# Patient Record
Sex: FEMALE | Employment: FULL TIME | URBAN - NONMETROPOLITAN AREA
[De-identification: names, ages, dates, MRNs, and addresses within clinical notes are randomized per-mention and may not be internally consistent; named-entity substitution may affect disease eponyms.]

---

## 2020-08-12 ENCOUNTER — TELEPHONE (OUTPATIENT)
Dept: FAMILY MEDICINE CLINIC | Age: 35
End: 2020-08-12

## 2020-08-12 ENCOUNTER — OFFICE VISIT (OUTPATIENT)
Dept: FAMILY MEDICINE CLINIC | Age: 35
End: 2020-08-12
Payer: COMMERCIAL

## 2020-08-12 VITALS
SYSTOLIC BLOOD PRESSURE: 124 MMHG | BODY MASS INDEX: 36.86 KG/M2 | DIASTOLIC BLOOD PRESSURE: 68 MMHG | HEIGHT: 63 IN | WEIGHT: 208 LBS

## 2020-08-12 PROCEDURE — 99385 PREV VISIT NEW AGE 18-39: CPT | Performed by: NURSE PRACTITIONER

## 2020-08-12 PROCEDURE — G0444 DEPRESSION SCREEN ANNUAL: HCPCS | Performed by: NURSE PRACTITIONER

## 2020-08-12 PROCEDURE — G8431 POS CLIN DEPRES SCRN F/U DOC: HCPCS | Performed by: NURSE PRACTITIONER

## 2020-08-12 RX ORDER — ESCITALOPRAM OXALATE 5 MG/1
5 TABLET ORAL DAILY
Qty: 60 TABLET | Refills: 1 | Status: SHIPPED | OUTPATIENT
Start: 2020-08-12 | End: 2020-11-04 | Stop reason: ALTCHOICE

## 2020-08-12 RX ORDER — ALBUTEROL SULFATE 90 UG/1
AEROSOL, METERED RESPIRATORY (INHALATION)
COMMUNITY
Start: 2019-05-21 | End: 2021-04-08 | Stop reason: ALTCHOICE

## 2020-08-12 RX ORDER — BUPROPION HYDROCHLORIDE 150 MG/1
150 TABLET ORAL DAILY
COMMUNITY
Start: 2020-01-21 | End: 2021-01-06 | Stop reason: SDUPTHER

## 2020-08-12 RX ORDER — ALPRAZOLAM 0.25 MG/1
0.25 TABLET ORAL 3 TIMES DAILY PRN
Qty: 30 TABLET | Refills: 0 | Status: SHIPPED | OUTPATIENT
Start: 2020-08-12 | End: 2021-01-06 | Stop reason: SDUPTHER

## 2020-08-12 RX ORDER — LISINOPRIL 5 MG/1
5 TABLET ORAL DAILY
COMMUNITY
Start: 2020-01-21

## 2020-08-12 RX ORDER — PIOGLITAZONEHYDROCHLORIDE 15 MG/1
15 TABLET ORAL DAILY
Qty: 30 TABLET | Refills: 3 | Status: SHIPPED | OUTPATIENT
Start: 2020-08-12 | End: 2020-09-29 | Stop reason: ALTCHOICE

## 2020-08-12 RX ORDER — GLIMEPIRIDE 4 MG/1
8 TABLET ORAL
COMMUNITY
Start: 2020-01-21 | End: 2020-11-04 | Stop reason: ALTCHOICE

## 2020-08-12 RX ORDER — BUSPIRONE HYDROCHLORIDE 10 MG/1
10 TABLET ORAL 3 TIMES DAILY PRN
COMMUNITY
Start: 2019-03-08 | End: 2020-09-28 | Stop reason: SINTOL

## 2020-08-12 RX ORDER — ALBUTEROL SULFATE 90 UG/1
2 AEROSOL, METERED RESPIRATORY (INHALATION) EVERY 6 HOURS PRN
Qty: 1 INHALER | Refills: 3 | Status: SHIPPED | OUTPATIENT
Start: 2020-08-12

## 2020-08-12 SDOH — ECONOMIC STABILITY: FOOD INSECURITY: WITHIN THE PAST 12 MONTHS, THE FOOD YOU BOUGHT JUST DIDN'T LAST AND YOU DIDN'T HAVE MONEY TO GET MORE.: NEVER TRUE

## 2020-08-12 SDOH — ECONOMIC STABILITY: FOOD INSECURITY: WITHIN THE PAST 12 MONTHS, YOU WORRIED THAT YOUR FOOD WOULD RUN OUT BEFORE YOU GOT MONEY TO BUY MORE.: NEVER TRUE

## 2020-08-12 SDOH — ECONOMIC STABILITY: INCOME INSECURITY: HOW HARD IS IT FOR YOU TO PAY FOR THE VERY BASICS LIKE FOOD, HOUSING, MEDICAL CARE, AND HEATING?: NOT HARD AT ALL

## 2020-08-12 ASSESSMENT — ENCOUNTER SYMPTOMS
RHINORRHEA: 0
DIARRHEA: 0
CHEST TIGHTNESS: 0
SHORTNESS OF BREATH: 0
SORE THROAT: 0
CONSTIPATION: 0
WHEEZING: 0
ABDOMINAL PAIN: 0
COUGH: 0
SINUS PAIN: 1
SINUS PRESSURE: 1

## 2020-08-12 ASSESSMENT — PATIENT HEALTH QUESTIONNAIRE - PHQ9
SUM OF ALL RESPONSES TO PHQ QUESTIONS 1-9: 16
1. LITTLE INTEREST OR PLEASURE IN DOING THINGS: 2
6. FEELING BAD ABOUT YOURSELF - OR THAT YOU ARE A FAILURE OR HAVE LET YOURSELF OR YOUR FAMILY DOWN: 1
10. IF YOU CHECKED OFF ANY PROBLEMS, HOW DIFFICULT HAVE THESE PROBLEMS MADE IT FOR YOU TO DO YOUR WORK, TAKE CARE OF THINGS AT HOME, OR GET ALONG WITH OTHER PEOPLE: 1
5. POOR APPETITE OR OVEREATING: 1
3. TROUBLE FALLING OR STAYING ASLEEP: 2
8. MOVING OR SPEAKING SO SLOWLY THAT OTHER PEOPLE COULD HAVE NOTICED. OR THE OPPOSITE, BEING SO FIGETY OR RESTLESS THAT YOU HAVE BEEN MOVING AROUND A LOT MORE THAN USUAL: 3
9. THOUGHTS THAT YOU WOULD BE BETTER OFF DEAD, OR OF HURTING YOURSELF: 0
SUM OF ALL RESPONSES TO PHQ9 QUESTIONS 1 & 2: 5
4. FEELING TIRED OR HAVING LITTLE ENERGY: 2
SUM OF ALL RESPONSES TO PHQ QUESTIONS 1-9: 16
7. TROUBLE CONCENTRATING ON THINGS, SUCH AS READING THE NEWSPAPER OR WATCHING TELEVISION: 2
2. FEELING DOWN, DEPRESSED OR HOPELESS: 3

## 2020-08-12 NOTE — PROGRESS NOTES
Name: Marielena Ruby  : 1985         Chief Complaint:     Chief Complaint   Patient presents with   [facility] Doctor     moved from [place] 6 months ago    Asthma     dx: ? treated with albuterol prn (typically a couple times monthly, more frequently during spring and sometimes fall. Has been on advair in the past    Diabetes     type II Dx: ? treated best with [place] it was cost prohibitive with previous insurance, currently taking farxiga and glimepiride, readings morning fasting 100-120 120 evening, higher with increased stress    Migraine     since high schoo, sporadic, stress induced, throbbing pain in temples, blurry vision, nausea, vomiting, occurs once monthly lasting a day, takes excedrin migraine for relief.  Depression     treated with buproprion 150 daily and buspar as needed (1-2x weekly) but it causes dizziness, and nausea, feel empty at times and \"despair\"     Anxiety     treated with buproprion 150 daily and buspar as needed (1-2x weekly) but it causes dizziness, and nausea constant panic and anxiety, panic attacks once/week    Insomnia     difficulty falling asleep, racing thoughts       History of Present Illness:      Amanda Real is a 27 y.o.  female who presents with [employer] Doctor (moved from [place] 6 months ago); Asthma (dx: ? treated with albuterol prn (typically a couple times monthly, more frequently during spring and sometimes fall. Has been on advair in the past); Diabetes (type II Dx: ? treated best with [place] it was cost prohibitive with previous insurance, currently taking farxiga and glimepiride, readings morning fasting 100-120 120 evening, higher with increased stress); Migraine (since high schoo, sporadic, stress induced, throbbing pain in temples, blurry vision, nausea, vomiting, occurs once monthly lasting a day, takes excedrin migraine for relief. );  Depression (treated with buproprion 150 daily and buspar as needed (1-2x weekly) but it causes dizziness, and nausea, feel empty at times and \"despair\" ); Anxiety (treated with buproprion 150 daily and buspar as needed (1-2x weekly) but it causes dizziness, and nausea constant panic and anxiety, panic attacks once/week); and Insomnia (difficulty falling asleep, racing thoughts)      HPI   Asthma:   The patient was diagnosed in 2001. She admits using her albuterol inhaler \"a couple times per month\". She admits using it more during spring and fall when she experiences allergy symptoms, as well as while she is sick. She states that the albuterol is successful in alleviating her symptoms of tightness in the chest and cough. She denies symptoms of coughing, wheezing, or SOB today. She admits SOB when she exercises, but states it is related to being overweight. Diabetes:   She reports she was diagnosed with type II DM in 2010. She was previously on [place] which she states worked well for her, keeping her fasting glucose between 90-95. However, she switched medications because Januvia became too expensive. She is currently taking dapagliflozin 10mg daily and glimepiride 8mg daily. Her current fasting blood glucoses average 100-120 in the morning. She states that she \"doesn't feel as well\" on this diabetes regimen. She admits hypoglycemia when she skips breakfast, but otherwise denies hypoglycemic episodes. She is allergic to metformin and experiences hives and vomiting when taking it. Migraines:  She admits a history of migraines that occur about once a month. She is not interested in taking medication at this time. Depression/Anxiety:   The patient admits recently moving from [place] to [place] to move in with her boyfriend. She has started a new job at [employer]. She reports depression that has been worsened within the last 6 months. She feels \"empty\" and \"hopeless\". She denies thoughts of hurting herself or others.  She currently takes wellbutrin 150mg daily which she states has done well improving her mood. She has tried previous anti-depressants in the past but did not favor them due to weight gain. She admits heightened anxiety within the last 6 months including panic attacks that occur \"once or more a week\". She is taking bupropion 10mg as needed for panic attacks and states that it makes her feel nauseous, makes her blood pressure drop, and it is \"debilitating\". She takes bupropion a \"couple of times per week\". She reports a traumatic event that occurred in 2010 which she suffers PTSD. She used to see a counselor but has not within the last 6 months, she is interested in this today. Past Medical History:     Past Medical History:   Diagnosis Date    Allergic rhinitis     Asthma     DM [facility]diabetes mellitus) ([facility] [address]     Migraines       Reviewed all health maintenance requirements and ordered appropriate tests  Health Maintenance Due   Topic Date Due    Potassium monitoring  1984    Creatinine monitoring  1984    Varicella vaccine (1 of 2 - 2-dose childhood series) 10/07/1985    Pneumococcal 0-64 years Vaccine (1 of 1 - PPSV23) 10/07/1990    Diabetic foot exam  10/07/1994    A1C test (Diabetic or Prediabetic)  10/07/1994    Diabetic retinal exam  10/07/1994    Lipid screen  10/07/1994    HIV screen  10/07/1999    Diabetic microalbuminuria test  10/07/2002    Hepatitis B vaccine (1 of 3 - Risk 3-dose series) 10/07/2003    DTaP/Tdap/Td vaccine (1 - Tdap) 10/07/2003    Cervical cancer screen  10/07/2005       Past Surgical History:     Past Surgical History:   Procedure Laterality Date    [facility]  2007    [facility]    KNEE ARTHROSCOPY  2001    right knee    TONSILLECTOMY  1988        Medications:       Prior to Admission medications    Medication Sig Start Date End Date Taking?  Authorizing Provider   dapagliflozin (FARXIGA) 10 MG tablet Take 10 mg by mouth daily 4/22/20  Yes Historical Provider, MD   glimepiride (AMARYL) 4 MG tablet Take 8 mg by mouth every morning (before breakfast) 1/21/20  Yes Historical Provider, MD   buPROPion (WELLBUTRIN XL) 150 MG extended release tablet Take 150 mg by mouth daily 1/21/20  Yes Historical Provider, MD   lisinopril (PRINIVIL;ZESTRIL) 5 MG tablet Take 5 mg by mouth daily 1/21/20  Yes Historical Provider, MD   busPIRone (BUSPAR) 10 MG tablet Take 10 mg by mouth 3 times daily as needed 3/8/19  Yes Historical Provider, MD   levonorgestrel (MIRENA) IUD 52 mg 1 each by Intrauterine route once   Yes Historical Provider, MD   albuterol sulfate HFA (VENTOLIN HFA) 108 (90 Base) MCG/ACT inhaler INHALE 2 PUFFS BY MOUTH EVERY 6 HOURS AS NEEDED FOR WHEEZING 5/21/19  Yes Historical Provider, MD   pioglitazone (ACTOS) 15 MG tablet Take 1 tablet by mouth daily 8/12/20  Yes BRIDGETT Luu CNP   escitalopram (LEXAPRO) 5 MG tablet Take 1 tablet by mouth daily 8/12/20  Yes BRIDGETT Luu CNP   albuterol sulfate HFA (PROAIR HFA) 108 (90 Base) MCG/ACT inhaler Inhale 2 puffs into the lungs every 6 hours as needed for Wheezing or Shortness of Breath 8/12/20  Yes BRIDGETT Luu CNP   ALPRAZolam Thamas Miranda) 0.25 MG tablet Take 1 tablet by mouth 3 times daily as needed for Anxiety for up to 30 days. 8/12/20 9/11/20 Yes BRIDGETT Luu CNP        Allergies:       Metformin; Avocado; Banana; Codeine; Cucumber extract; Food; Nut [peanut-containing drug products]; Other; Seasonal; and Percocet [oxycodone-acetaminophen]    Social History:     Tobacco:    reports that she has never smoked. She has never used smokeless tobacco.  Alcohol:      reports current alcohol use. Drug Use:  reports no history of drug use.     Family History:     Family History   Problem Relation Age of Onset    Stroke Father     Heart Disease Father     Diabetes Father     High Blood Pressure Father     Diabetes type 2  Brother     Heart Attack Maternal Grandfather        Review of Systems:     Positive and Negative as described puffs into the lungs every 6 hours as needed for Wheezing or Shortness of Breath    ALPRAZolam (XANAX) 0.25 MG tablet 30 tablet 0     Sig: Take 1 tablet by mouth 3 times daily as needed for Anxiety for up to 30 days. Orders Placed This Encounter   Procedures    Lipid Panel     Standing Status:   Future     Standing Expiration Date:   8/12/2021     Order Specific Question:   Is Patient Fasting?/# of Hours     Answer:   non-fasting    Hemoglobin A1C     Standing Status:   Future     Standing Expiration Date:   8/12/2021    Comprehensive Metabolic Panel     Standing Status:   Future     Standing Expiration Date:   8/12/2021    CBC With Auto Differential     Standing Status:   Future     Standing Expiration Date:   8/12/2021    Microalbumin / Creatinine Urine Ratio     Standing Status:   Future     Standing Expiration Date:   8/12/2021    External Referral To Counseling Services     Referral Priority:   Routine     Referral Type:   Eval and Treat     Referral Reason:   Specialty Services Required     Referred to Provider:   Joshua Jimenez     Requested Specialty:   Clinical Counselor     Number of Visits Requested:   1    Positive Screen for Clinical Depression with a Documented Follow-up Plan         No results found for this visit on 08/12/20. Return in about 4 weeks (around 9/9/2020), or if symptoms worsen or fail to improve. Electronically signed by BRIDGETT Mckeon CNP on 08/19/20 at 10:02 AM.    On the basis of positive PHQ-9 screening (PHQ-9 Total Score: 16), the following plan was implemented: alteration in medication and referral to counseling. Patient will follow-up in 1 month(s) with PCP.

## 2020-08-12 NOTE — TELEPHONE ENCOUNTER
Patient was called, VM was left instructing patient to call the office. She was given a referral to counseling today. Please instruct the patient to call the counseling office and schedule a meeting. Counseling office details below, please share with patient:     [facility] [place]  [address]   Phone: [phone]  Fax: none  Email: [email or web address]    Thank you!

## 2020-09-27 LAB
ABSOLUTE BASO #: 0.1 X10E9/L (ref 0–0.2)
ABSOLUTE EOS #: 0.2 X10E9/L (ref 0–0.4)
ABSOLUTE LYMPH #: 3.9 X10E9/L (ref 1–3.5)
ABSOLUTE MONO #: 0.9 X10E9/L (ref 0–0.9)
ABSOLUTE NEUT #: 7 X10E9/L (ref 1.5–6.6)
ALBUMIN SERPL-MCNC: 4.2 G/DL (ref 3.2–5.3)
ALK PHOSPHATASE: 55 U/L (ref 39–130)
ALT SERPL-CCNC: 24 U/L (ref 0–31)
ANION GAP SERPL CALCULATED.3IONS-SCNC: 11 MMOL/L (ref 5–15)
AST SERPL-CCNC: 18 U/L (ref 0–41)
AVERAGE GLUCOSE: 252 MG/DL
BASOPHILS RELATIVE PERCENT: 0.6 %
BILIRUB SERPL-MCNC: 0.5 MG/DL (ref 0.3–1.2)
BUN BLDV-MCNC: 8 MG/DL (ref 5–23)
CALCIUM SERPL-MCNC: 9.4 MG/DL (ref 8.5–10.5)
CHLORIDE BLD-SCNC: 98 MMOL/L (ref 98–109)
CHOLESTEROL/HDL RATIO: 6 (ref 1–5)
CHOLESTEROL: 266 MG/DL (ref 150–200)
CO2: 27 MMOL/L (ref 22–32)
CREAT SERPL-MCNC: 0.55 MG/DL (ref 0.4–1)
CREATINE, URINE: 74.27 MG/DL
EGFR AFRICAN AMERICAN: >60 ML/MIN/1.73SQ.M
EGFR IF NONAFRICAN AMERICAN: >60 ML/MIN/1.73SQ.M
EOSINOPHILS RELATIVE PERCENT: 1.3 %
GLUCOSE: 299 MG/DL (ref 65–99)
HBA1C MFR BLD: 10.4 % (ref 4.4–6.4)
HCT VFR BLD CALC: 44.4 % (ref 35–47)
HDLC SERPL-MCNC: 44 MG/DL
HEMOGLOBIN: 14.7 G/DL (ref 11.7–15.5)
LDL CHOLESTEROL CALCULATED: 172 MG/DL
LDL/HDL RATIO: 3.9
LYMPHOCYTE %: 32.7 %
MCH RBC QN AUTO: 29.5 PG (ref 27–34)
MCHC RBC AUTO-ENTMCNC: 33 G/DL (ref 32–36)
MCV RBC AUTO: 89 FL (ref 80–100)
MICROALBUMIN/CREAT 24H UR: 0.8 MG/DL (ref 0–1.9)
MICROALBUMIN/CREAT UR-RTO: 10.8 MG/G CREAT (ref 0–30)
MONOCYTES # BLD: 7.5 %
NEUTROPHILS RELATIVE PERCENT: 57.9 %
PDW BLD-RTO: 13.4 % (ref 11.5–15)
PLATELETS: 371 X10E9/L (ref 150–450)
PMV BLD AUTO: 8.4 FL (ref 7–12)
POTASSIUM SERPL-SCNC: 3.9 MMOL/L (ref 3.5–5)
RBC: 4.97 X10E12/L (ref 3.8–5.2)
SODIUM BLD-SCNC: 136 MMOL/L (ref 134–146)
TOTAL PROTEIN: 7.4 G/DL (ref 6–8)
TRIGL SERPL-MCNC: 249 MG/DL (ref 27–150)
VLDLC SERPL CALC-MCNC: 50 MG/DL (ref 0–30)
WBC: 12 X10E9/L (ref 4–11)

## 2020-09-28 ENCOUNTER — OFFICE VISIT (OUTPATIENT)
Dept: FAMILY MEDICINE CLINIC | Age: 35
End: 2020-09-28
Payer: COMMERCIAL

## 2020-09-28 VITALS
HEIGHT: 63 IN | WEIGHT: 214 LBS | SYSTOLIC BLOOD PRESSURE: 118 MMHG | BODY MASS INDEX: 37.92 KG/M2 | DIASTOLIC BLOOD PRESSURE: 88 MMHG

## 2020-09-28 PROCEDURE — 90732 PPSV23 VACC 2 YRS+ SUBQ/IM: CPT | Performed by: NURSE PRACTITIONER

## 2020-09-28 PROCEDURE — 99214 OFFICE O/P EST MOD 30 MIN: CPT | Performed by: NURSE PRACTITIONER

## 2020-09-28 PROCEDURE — 90471 IMMUNIZATION ADMIN: CPT | Performed by: NURSE PRACTITIONER

## 2020-09-28 ASSESSMENT — PATIENT HEALTH QUESTIONNAIRE - PHQ9
SUM OF ALL RESPONSES TO PHQ QUESTIONS 1-9: 2
2. FEELING DOWN, DEPRESSED OR HOPELESS: 1
SUM OF ALL RESPONSES TO PHQ QUESTIONS 1-9: 2
SUM OF ALL RESPONSES TO PHQ9 QUESTIONS 1 & 2: 2
1. LITTLE INTEREST OR PLEASURE IN DOING THINGS: 1

## 2020-09-28 ASSESSMENT — ENCOUNTER SYMPTOMS
ABDOMINAL PAIN: 0
DIARRHEA: 0
NAUSEA: 1
CONSTIPATION: 0

## 2020-09-28 NOTE — PATIENT INSTRUCTIONS
Eat routine, scheduled meals to avoid low blood sugar episodes. Continue to follow a well balanced diet. Encouraged regular physical activity 30 minutes per day. Patient to follow up with counseling. Patient to call the office if any worsening symptoms or side effects. Continue to monitor blood sugar once daily. I will call patient with lab results.

## 2020-09-28 NOTE — PROGRESS NOTES
Name: Rohit Chau  : 1985         Chief Complaint:     Chief Complaint   Patient presents with    Follow-up     patient denies any issues. History of Present Illness:      Amanda Real is a 27 y.o.  female who presents with Follow-up (patient denies any issues. )      HPI   Anxiety/Depression:   The patient presents for a one month follow up for anxiety and depression. She is currently taking bupropion 150mg daily, escitalopram 5mg daily, and alprazolam 0.25mg prn. She admits discontinuing buspirone one month ago due to side effects and is feeling much better. She denies side effects from current medication. She admits that in regards to her depression, she is feeling 90% better. She denies feelings of emptiness and loneliness that were present prior. She admits continuous elevated anxiety but states that she is able to manage her stress much better. Patient is taking xanax \"a couple times per week\", but not daily. Admits one panic attack in the last month which was resolved with xanax. She has an order for counseling that she has been unable to begin due to her schedule but is planning to set this up next week. Denies abdominal pain, chest pain, or palpitations. Denies thoughts of hurting self or others. Admits increased heart rate when coming into doctor's offices. Diabetes Mellitus:  The patient is currently taking glimepiride 8mg daily, dapagliflozin 10mg daily, and pioglitazone 15mg daily. Admits feeling better with the addition of pioglitazone. She has an allergy to metformin and is unable to financial afford [place], she states. She admits compliance with all medication. She is monitoring her blood glucose once at home once in the morning, readings averaging 100 fasting. She admits several occasions of hypoglycemia when she skips meals. For exercise, she is walking her dogs three times per day and participating in yoga and pilates.  Her diet consists of [employer] meals that contain protein, vegetable, and a carb. Her last eye exam was 12/2019 and she is planning to follow up 12/2020. Past Medical History:     Past Medical History:   Diagnosis Date    Allergic rhinitis     Asthma     DM (diabetes mellitus) ([address]     Migraines       Reviewed all health maintenance requirements and ordered appropriate tests  Health Maintenance Due   Topic Date Due    Potassium monitoring  1984    Creatinine monitoring  1984    Varicella vaccine (1 of 2 - 2-dose childhood series) 10/07/1985    Diabetic foot exam  10/07/1994    A1C test (Diabetic or Prediabetic)  10/07/1994    Diabetic retinal exam  10/07/1994    Lipid screen  10/07/1994    HIV screen  10/07/1999    Diabetic microalbuminuria test  10/07/2002    Hepatitis B vaccine (1 of 3 - Risk 3-dose series) 10/07/2003    DTaP/Tdap/Td vaccine (1 - Tdap) 10/07/2003    Cervical cancer screen  10/07/2005    Flu vaccine (1) 09/01/2020       Past Surgical History:     Past Surgical History:   Procedure Laterality Date    [facility]  2007    [facility]    KNEE ARTHROSCOPY  2001    right knee    TONSILLECTOMY  1988        Medications:       Prior to Admission medications    Medication Sig Start Date End Date Taking?  Authorizing Provider   buPROPion (WELLBUTRIN XL) 150 MG extended release tablet Take 150 mg by mouth daily 1/21/20  Yes Historical Provider, MD   lisinopril (PRINIVIL;ZESTRIL) 5 MG tablet Take 5 mg by mouth daily 1/21/20  Yes Historical Provider, MD   levonorgestrel (MIRENA) IUD 52 mg 1 each by Intrauterine route once   Yes Historical Provider, MD   albuterol sulfate HFA (VENTOLIN HFA) 108 (90 Base) MCG/ACT inhaler INHALE 2 PUFFS BY MOUTH EVERY 6 HOURS AS NEEDED FOR WHEEZING 5/21/19  Yes Historical Provider, MD   pioglitazone (ACTOS) 15 MG tablet Take 1 tablet by mouth daily 8/12/20  Yes BRIDGETT Arndt CNP   escitalopram (LEXAPRO) 5 MG tablet Take 1 tablet by mouth daily 8/12/20  Yes Ashleigh Elder APRN - CNP   albuterol sulfate HFA (PROAIR HFA) 108 (90 Base) MCG/ACT inhaler Inhale 2 puffs into the lungs every 6 hours as needed for Wheezing or Shortness of Breath 8/12/20  Yes Severa HeatherBRIDGETT CNP   dapagliflozin (FARXIGA) 10 MG tablet Take 10 mg by mouth daily 4/22/20   Historical Provider, MD   glimepiride (AMARYL) 4 MG tablet Take 8 mg by mouth every morning (before breakfast) 1/21/20   Historical Provider, MD        Allergies:       Metformin; Avocado; Banana; Codeine; Cucumber extract; Food; Nut [peanut-containing drug products]; Other; Seasonal; and Percocet [oxycodone-acetaminophen]    Social History:     Tobacco:    reports that she has never smoked. She has never used smokeless tobacco.  Alcohol:      reports current alcohol use. Drug Use:  reports no history of drug use. Family History:     Family History   Problem Relation Age of Onset    Stroke Father     Heart Disease Father     Diabetes Father     High Blood Pressure Father     Diabetes type 2  Brother     Heart Attack Maternal Grandfather        Review of Systems:     Positive and Negative as described in HPI    Review of Systems   Constitutional: Negative for chills and fever. Cardiovascular: Negative for chest pain and palpitations. Gastrointestinal: Positive for nausea (Admits nausea when beginning actos and lexapro. Symptoms peaked 2 weeks ago and have been improving. Denies vomiting.). Negative for abdominal pain, constipation and diarrhea. Genitourinary: Negative for difficulty urinating, frequency and urgency. Denies increased hunger   Neurological: Positive for headaches (Admits tension headaches that are relieved with ibuprofen). Negative for dizziness, weakness, light-headedness and numbness. Physical Exam:   Vitals:  /88   Ht 5' 3\" (1.6 m)   Wt 214 lb (97.1 kg)   BMI 37.91 kg/m²     Physical Exam  Constitutional:       General: She is not in acute distress. Appearance: Normal appearance. She is obese. She is not ill-appearing or diaphoretic. HENT:      Head: Normocephalic. Cardiovascular:      Rate and Rhythm: Regular rhythm. Tachycardia present. Heart sounds: Normal heart sounds. No murmur. Comments:   Pulmonary:      Effort: Pulmonary effort is normal. No respiratory distress. Breath sounds: Normal breath sounds. No stridor. No wheezing, rhonchi or rales. Abdominal:      General: Bowel sounds are normal.      Palpations: Abdomen is soft. Neurological:      Mental Status: She is alert and oriented to person, place, and time. Psychiatric:         Mood and Affect: Mood normal.         Behavior: Behavior normal.         Thought Content: Thought content normal.         Judgment: Judgment normal.         Data:     No results found for: NA, K, CL, CO2, BUN, CREATININE, GLUCOSE, PROT, LABALBU, BILITOT, ALKPHOS, AST, ALT  No results found for: WBC, RBC, HGB, HCT, MCV, MCH, MCHC, RDW, PLT, MPV  No results found for: TSH  No results found for: CHOL, HDL, PSA, LABA1C    Assessment/Plan:      Diagnosis Orders   1. Type 2 diabetes mellitus with hyperglycemia, without long-term current use of insulin ([facility] [address]     2. Moderate episode of recurrent major depressive disorder ([facility] [address]     3. Anxiety     4. Mild intermittent asthma without complication           DM:  - Patient reports having labs completed last week that will be faxed to the office. I told her it is important for me to review those labs, specifically her A1c, before making any changes to her medications. When I receive her labs, I will call her to discuss results and alter treatment plan as necessary. At this time, I am pleased with her fasting blood glucose levels of 100. - Patient to continue monitoring blood glucose once per day and call the office with any concerns of hypoglycemia or hyperglycemia. - Supplied health counseling on diet and exercise. Discussed monitoring sugar and carb intake.    - Retinal eye exam completed 12/2019, patient will follow for repeat exam 12/2020.   - Patient denies diabetic foot exam and interest in diabetes education today. Anxiety/Depression:  - Patient to continue current medication regimen. Discussed importance of maintaining baseline levels of anxiety with the goal of decreasing xanax use. She would like to try counseling prior to making any medication changes. Wellness:  - Patient reports normal pap exam 2 years ago. She is interested in establishing a gynecologic provider. She was given the phone number for [facility] to schedule an appointment.   - Recommended flu vaccination this fall.   - Patient given [employer] information packet regarding PPSV23 d/t history of asthma and DM. Patient agreed and received vaccination today. Completed Refills   Requested Prescriptions      No prescriptions requested or ordered in this encounter       Orders Placed This Encounter   Procedures    Pneumococcal polysaccharide vaccine 23-valent greater than or equal to 1yo subcutaneous/IM        No results found for this visit on 09/28/20. Return in about 3 months (around 12/28/2020), or if symptoms worsen or fail to improve, for follow up. .    Electronically signed by BRIDGETT Mcgee CNP on 09/28/20 at 9:16 AM.

## 2020-09-29 RX ORDER — ATORVASTATIN CALCIUM 10 MG/1
10 TABLET, FILM COATED ORAL DAILY
Qty: 60 TABLET | Refills: 2 | Status: SHIPPED | OUTPATIENT
Start: 2020-09-29 | End: 2021-01-25

## 2020-09-29 RX ORDER — PIOGLITAZONEHYDROCHLORIDE 45 MG/1
45 TABLET ORAL DAILY
Qty: 60 TABLET | Refills: 2 | Status: SHIPPED | OUTPATIENT
Start: 2020-09-29 | End: 2020-11-04 | Stop reason: SDUPTHER

## 2020-11-04 ENCOUNTER — OFFICE VISIT (OUTPATIENT)
Dept: FAMILY MEDICINE CLINIC | Age: 35
End: 2020-11-04
Payer: COMMERCIAL

## 2020-11-04 VITALS
HEIGHT: 63 IN | BODY MASS INDEX: 38.45 KG/M2 | WEIGHT: 217 LBS | DIASTOLIC BLOOD PRESSURE: 80 MMHG | SYSTOLIC BLOOD PRESSURE: 112 MMHG | OXYGEN SATURATION: 99 % | HEART RATE: 98 BPM

## 2020-11-04 PROBLEM — F33.1 MODERATE EPISODE OF RECURRENT MAJOR DEPRESSIVE DISORDER (HCC): Status: ACTIVE | Noted: 2020-11-04

## 2020-11-04 PROBLEM — F41.9 ANXIETY: Status: ACTIVE | Noted: 2020-11-04

## 2020-11-04 PROCEDURE — G8431 POS CLIN DEPRES SCRN F/U DOC: HCPCS | Performed by: NURSE PRACTITIONER

## 2020-11-04 PROCEDURE — 99214 OFFICE O/P EST MOD 30 MIN: CPT | Performed by: NURSE PRACTITIONER

## 2020-11-04 PROCEDURE — G0444 DEPRESSION SCREEN ANNUAL: HCPCS | Performed by: NURSE PRACTITIONER

## 2020-11-04 RX ORDER — PIOGLITAZONEHYDROCHLORIDE 45 MG/1
45 TABLET ORAL DAILY
Qty: 60 TABLET | Refills: 2 | Status: SHIPPED | OUTPATIENT
Start: 2020-11-04 | End: 2021-03-04

## 2020-11-04 RX ORDER — ESCITALOPRAM OXALATE 10 MG/1
10 TABLET ORAL DAILY
Qty: 30 TABLET | Refills: 2 | Status: SHIPPED | OUTPATIENT
Start: 2020-11-04 | End: 2020-12-31

## 2020-11-04 ASSESSMENT — PATIENT HEALTH QUESTIONNAIRE - PHQ9
2. FEELING DOWN, DEPRESSED OR HOPELESS: 2
10. IF YOU CHECKED OFF ANY PROBLEMS, HOW DIFFICULT HAVE THESE PROBLEMS MADE IT FOR YOU TO DO YOUR WORK, TAKE CARE OF THINGS AT HOME, OR GET ALONG WITH OTHER PEOPLE: 1
SUM OF ALL RESPONSES TO PHQ QUESTIONS 1-9: 16
7. TROUBLE CONCENTRATING ON THINGS, SUCH AS READING THE NEWSPAPER OR WATCHING TELEVISION: 3
SUM OF ALL RESPONSES TO PHQ9 QUESTIONS 1 & 2: 4
5. POOR APPETITE OR OVEREATING: 0
4. FEELING TIRED OR HAVING LITTLE ENERGY: 3
3. TROUBLE FALLING OR STAYING ASLEEP: 0
SUM OF ALL RESPONSES TO PHQ QUESTIONS 1-9: 16
8. MOVING OR SPEAKING SO SLOWLY THAT OTHER PEOPLE COULD HAVE NOTICED. OR THE OPPOSITE, BEING SO FIGETY OR RESTLESS THAT YOU HAVE BEEN MOVING AROUND A LOT MORE THAN USUAL: 3
SUM OF ALL RESPONSES TO PHQ QUESTIONS 1-9: 16
9. THOUGHTS THAT YOU WOULD BE BETTER OFF DEAD, OR OF HURTING YOURSELF: 0
6. FEELING BAD ABOUT YOURSELF - OR THAT YOU ARE A FAILURE OR HAVE LET YOURSELF OR YOUR FAMILY DOWN: 3
1. LITTLE INTEREST OR PLEASURE IN DOING THINGS: 2

## 2020-11-04 ASSESSMENT — ENCOUNTER SYMPTOMS
VOMITING: 0
DIARRHEA: 0
COUGH: 1
NAUSEA: 0

## 2020-11-04 ASSESSMENT — COLUMBIA-SUICIDE SEVERITY RATING SCALE - C-SSRS
2. HAVE YOU ACTUALLY HAD ANY THOUGHTS OF KILLING YOURSELF?: NO
1. WITHIN THE PAST MONTH, HAVE YOU WISHED YOU WERE DEAD OR WISHED YOU COULD GO TO SLEEP AND NOT WAKE UP?: NO
6. HAVE YOU EVER DONE ANYTHING, STARTED TO DO ANYTHING, OR PREPARED TO DO ANYTHING TO END YOUR LIFE?: NO

## 2020-11-04 NOTE — PROGRESS NOTES
Name: Bryant Serrano  : 1985         Chief Complaint:     Chief Complaint   Patient presents with    Follow-up     Patient comes in for 1 month recheck. denies any issues. History of Present Illness:      Amanda Real is a 38 y.o.  female who presents with Follow-up (Patient comes in for 1 month recheck. denies any issues. )      HPI     The patient presents for 1 month follow up. She has a history of depression, anxiety, and DM. Diabetes Mellitus:   The patient's most recent A1c was 10.4 on 20. She is currently taking pioglitazone 45mg. Due to miscommunication with medications, she discontinued her dapagliflozin 10mg and glimepiride 8mg daily when she initiated pioglitazone. She is checking her blood glucose twice daily. Her fasting morning glucose averages 100-120. Her immediate post-prandial after dinner averages 150-200. She admits elevated numbers this weekend due to her sister's wedding. For exercise, she is using the stair stepper at night and walking her dogs 3 times daily. She is eating [employer] 4 times per week and is eating out less during the week. She admits eating a set amount of carbs. She denies hypoglycemic events. Denies lightheadedness, dizziness, shakiness, polyuria, polydipsia, or polyphagia. She denies sores that are slow to heal.     Depression/Anxiety:  The patient has a history of depression and anxiety. She is currently taking lexapro 5mg and wellbutrin 150mg daily. She states that she feels as if she is an \"overwhelming funk\". She admits feeling exhausted over the last 2 weeks which she attributes to her sister's recent wedding last weekend. She is sleeping 7-8 hour per night. Admits slight cough with intermittent clear nasal drainage. She denies fever, loss of taste or smell, diarrhea, nausea, or vomiting. She states that her anxiety comes and goes and yesterday she felt depressed.  She is unsure the reasoning for her depression but admits increased stress due MCG/ACT inhaler INHALE 2 PUFFS BY MOUTH EVERY 6 HOURS AS NEEDED FOR WHEEZING 5/21/19  Yes Historical Provider, MD   albuterol sulfate HFA (PROAIR HFA) 108 (90 Base) MCG/ACT inhaler Inhale 2 puffs into the lungs every 6 hours as needed for Wheezing or Shortness of Breath 8/12/20  Yes BRIDGETT Calles CNP        Allergies:       Metformin; Avocado; Banana; Codeine; Cucumber extract; Food; Nut [peanut-containing drug products]; Other; Seasonal; and Percocet [oxycodone-acetaminophen]    Social History:     Tobacco:    reports that she has never smoked. She has never used smokeless tobacco.  Alcohol:      reports current alcohol use. Drug Use:  reports no history of drug use. Family History:     Family History   Problem Relation Age of Onset    Stroke Father     Heart Disease Father     Diabetes Father     High Blood Pressure Father     Diabetes type 2  Brother     Heart Attack Maternal Grandfather        Review of Systems:     Positive and Negative as described in HPI    Review of Systems   Constitutional: Negative for fever. Respiratory: Positive for cough (Admits mild cough). Gastrointestinal: Negative for diarrhea, nausea and vomiting. Endocrine: Negative for polydipsia, polyphagia and polyuria. Neurological: Positive for headaches (Admits intermittent headaches). Negative for dizziness and light-headedness. Physical Exam:   Vitals:  /80   Pulse 98   Ht 5' 3\" (1.6 m)   Wt 217 lb (98.4 kg)   SpO2 99%   BMI 38.44 kg/m²     Physical Exam  Constitutional:       General: She is not in acute distress. Appearance: Normal appearance. She is obese. She is not ill-appearing or toxic-appearing. HENT:      Head: Normocephalic. Cardiovascular:      Rate and Rhythm: Normal rate and regular rhythm. Pulses:           Posterior tibial pulses are 2+ on the right side and 2+ on the left side. Heart sounds: Normal heart sounds. No murmur.    Pulmonary:      Effort: Pulmonary effort is normal. No respiratory distress. Breath sounds: Normal breath sounds. No stridor. No wheezing, rhonchi or rales. Feet:      Right foot:      Protective Sensation: 6 sites tested. 6 sites sensed. Skin integrity: Skin integrity normal. No ulcer, blister, skin breakdown, erythema, warmth, callus, dry skin or fissure. Toenail Condition: Right toenails are normal.      Left foot:      Protective Sensation: 6 sites tested. 6 sites sensed. Skin integrity: Skin integrity normal. No ulcer, blister, skin breakdown, erythema, warmth, callus, dry skin or fissure. Toenail Condition: Left toenails are normal.   Skin:     General: Skin is warm. Neurological:      Mental Status: She is alert and oriented to person, place, and time. Psychiatric:         Mood and Affect: Mood normal.         Behavior: Behavior normal.         Thought Content: Thought content normal.         Judgment: Judgment normal.         Data:     Lab Results   Component Value Date     09/26/2020    K 3.9 09/26/2020    CL 98 09/26/2020    CO2 27 09/26/2020    BUN 8 09/26/2020    CREATININE 0.55 09/26/2020    GLUCOSE 299 09/26/2020    PROT 7.4 09/26/2020    LABALBU 4.2 09/26/2020    BILITOT 0.5 09/26/2020    ALKPHOS 55 09/26/2020    AST 18 09/26/2020    ALT 24 09/26/2020     Lab Results   Component Value Date    WBC 12.0 09/26/2020    RBC 4.97 09/26/2020    HGB 14.7 09/26/2020    HCT 44.4 09/26/2020    MCV 89 09/26/2020    MCH 29.5 09/26/2020    MCHC 33.0 09/26/2020    RDW 13.4 09/26/2020     09/26/2020    MPV 8.4 09/26/2020     No results found for: TSH  Lab Results   Component Value Date    CHOL 266 09/26/2020    HDL 44 09/26/2020    LABA1C 10.4 09/26/2020       Assessment/Plan:      Diagnosis Orders   1. Type 2 diabetes mellitus without complication, without long-term current use of insulin (HCC)   DIABETES FOOT EXAM   2. Anxiety     3. Moderate episode of recurrent major depressive disorder ([facility] [address]     4. Positive depression screening         DM:   - Most recent A1c 10.4 on 9/26/20. Currently taking pioglitazone 45mg daily, exercising using stair stepper and walking, and using portion control and carb restriction. - Will add dapagliflozin 5mg daily with goal of upwards titration. Will likely add back third oral diabetic therapy at next visit. - Referral to diabetes education placed at previous visit. Patient has not completed d/t schedule but admits interest and plans to attend. - Reviewed diet and exercise recommendations. Reviewed s/s of hypoglycemia.   - Continue monitoring blood glucose once fasting in the morning and again 2 hours after dinner.   - Will complete A1c in 2 months. Anxiety/Depression:   - Will increase lexapro from 5mg to 10mg daily. - Continue counseling.   - Patient to call if symptoms worsen or persist.     Completed Refills   Requested Prescriptions     Signed Prescriptions Disp Refills    escitalopram (LEXAPRO) 10 MG tablet 30 tablet 2     Sig: Take 1 tablet by mouth daily    pioglitazone (ACTOS) 45 MG tablet 60 tablet 2     Sig: Take 1 tablet by mouth daily    dapagliflozin (FARXIGA) 5 MG tablet 30 tablet 2     Sig: Take 1 tablet by mouth every morning       Orders Placed This Encounter   Procedures     DIABETES FOOT EXAM        No results found for this visit on 11/04/20. Return in about 2 months (around 1/4/2021), or if symptoms worsen or fail to improve, for f/u DM and depression with A1c.     Electronically signed by BRIDGETT George CNP on 11/04/20 at 1:58 PM.

## 2021-01-06 ENCOUNTER — OFFICE VISIT (OUTPATIENT)
Dept: FAMILY MEDICINE CLINIC | Age: 36
End: 2021-01-06
Payer: COMMERCIAL

## 2021-01-06 VITALS
HEART RATE: 99 BPM | HEIGHT: 63 IN | WEIGHT: 218 LBS | OXYGEN SATURATION: 99 % | BODY MASS INDEX: 38.62 KG/M2 | DIASTOLIC BLOOD PRESSURE: 80 MMHG | SYSTOLIC BLOOD PRESSURE: 126 MMHG

## 2021-01-06 DIAGNOSIS — F41.9 ANXIETY: ICD-10-CM

## 2021-01-06 DIAGNOSIS — F33.1 MODERATE EPISODE OF RECURRENT MAJOR DEPRESSIVE DISORDER (HCC): ICD-10-CM

## 2021-01-06 DIAGNOSIS — E11.9 TYPE 2 DIABETES MELLITUS WITHOUT COMPLICATION, WITHOUT LONG-TERM CURRENT USE OF INSULIN (HCC): Primary | ICD-10-CM

## 2021-01-06 DIAGNOSIS — Z13.31 POSITIVE DEPRESSION SCREENING: ICD-10-CM

## 2021-01-06 DIAGNOSIS — E78.49 OTHER HYPERLIPIDEMIA: ICD-10-CM

## 2021-01-06 DIAGNOSIS — E78.5 HYPERLIPIDEMIA, UNSPECIFIED HYPERLIPIDEMIA TYPE: ICD-10-CM

## 2021-01-06 PROCEDURE — 99214 OFFICE O/P EST MOD 30 MIN: CPT | Performed by: NURSE PRACTITIONER

## 2021-01-06 RX ORDER — ALPRAZOLAM 0.25 MG/1
0.25 TABLET ORAL 3 TIMES DAILY PRN
Qty: 15 TABLET | Refills: 0 | Status: SHIPPED | OUTPATIENT
Start: 2021-01-06 | End: 2021-01-21

## 2021-01-06 RX ORDER — BLOOD-GLUCOSE METER
1 KIT MISCELLANEOUS DAILY
Qty: 1 KIT | Refills: 0 | Status: SHIPPED | OUTPATIENT
Start: 2021-01-06

## 2021-01-06 RX ORDER — ESCITALOPRAM OXALATE 20 MG/1
20 TABLET ORAL DAILY
Qty: 30 TABLET | Refills: 2 | Status: SHIPPED | OUTPATIENT
Start: 2021-01-06 | End: 2021-02-01

## 2021-01-06 RX ORDER — BUPROPION HYDROCHLORIDE 150 MG/1
150 TABLET ORAL DAILY
Qty: 60 TABLET | Refills: 2 | Status: SHIPPED | OUTPATIENT
Start: 2021-01-06 | End: 2021-04-08 | Stop reason: SDUPTHER

## 2021-01-06 ASSESSMENT — PATIENT HEALTH QUESTIONNAIRE - PHQ9
9. THOUGHTS THAT YOU WOULD BE BETTER OFF DEAD, OR OF HURTING YOURSELF: 0
SUM OF ALL RESPONSES TO PHQ QUESTIONS 1-9: 16
8. MOVING OR SPEAKING SO SLOWLY THAT OTHER PEOPLE COULD HAVE NOTICED. OR THE OPPOSITE, BEING SO FIGETY OR RESTLESS THAT YOU HAVE BEEN MOVING AROUND A LOT MORE THAN USUAL: 2
2. FEELING DOWN, DEPRESSED OR HOPELESS: 2
4. FEELING TIRED OR HAVING LITTLE ENERGY: 2
SUM OF ALL RESPONSES TO PHQ QUESTIONS 1-9: 16
1. LITTLE INTEREST OR PLEASURE IN DOING THINGS: 2
5. POOR APPETITE OR OVEREATING: 2
3. TROUBLE FALLING OR STAYING ASLEEP: 2

## 2021-01-06 ASSESSMENT — COLUMBIA-SUICIDE SEVERITY RATING SCALE - C-SSRS
1. WITHIN THE PAST MONTH, HAVE YOU WISHED YOU WERE DEAD OR WISHED YOU COULD GO TO SLEEP AND NOT WAKE UP?: NO
6. HAVE YOU EVER DONE ANYTHING, STARTED TO DO ANYTHING, OR PREPARED TO DO ANYTHING TO END YOUR LIFE?: NO
2. HAVE YOU ACTUALLY HAD ANY THOUGHTS OF KILLING YOURSELF?: NO

## 2021-01-06 NOTE — PROGRESS NOTES
Name: Francis Fitzpatrick  : 1985         Chief Complaint:     Chief Complaint   Patient presents with    Depression     Patient returns for 2 month F/U. States she is having a hard time with depression. States it may just be the season. History of Present Illness:      Amanda Real is a 38 y.o.  female who presents with Depression (Patient returns for 2 month F/U. States she is having a hard time with depression. States it may just be the season. )      HPI  The patient presents for a 2 month follow up. She has a history of diabetes mellitus, hyperlipidemia, asthma, depression, and anxiety. DM:   Diabetic diet/low carb diet compliance:  She admits high carbohydrate diet. She had 2 molars removed and is on a soft diet. She is not following a diabetic diet. Current exercise: She is walking 2-3 times per day with the dogs and stair stepper. Most recent A1c: 10.4% on 20. Diabetes Education: Referral in place. She denies attendance. Frequency of exercise: 3 times per week  Frequency of glucose testing at home: Twice daily, once fasting in the morning and again after dinner  Home blood sugar records: fasting in the mornin-150. After dinner: 200-250. Glucometer at home: Admits her screen broke 3 weeks ago   History of hypoglycemic episodes: no  Most recent eye examination: 2019  Diabetic foot check in the past year Yes. Last diabetic foot check: 2020  Current symptoms: Denies excessive hunger, excessive thirst, or increased frequency of urination. Denies numbness/tingling in hands or feet, or major changes in weight. Denies vision changes or sores that are slow to heal. Admits fatigue. reports that she has never smoked.  She has never used smokeless tobacco.   Medication compliance:  compliant all of the time Medication Therapy: pioglitazone 45mg once daily and dapagliflozin 5mg. She was previously on [place] and [place] but discontinued due to cost. She notes an allergic reaction to metformin including hives and vomiting. She is reinitiating medication due to miscommunication related to stopping her previous medications. Anxiety/Depression:   Current medication regimen includes lexapro 10mg and welbutrin XL 150mg. She admits post-holiday \"letdown\". She is taking xanax once every couple weeks. Admits panic attacks once every couple of weeks. Denies thoughts of hurting self or others. She is currently enrolled in counseling once per month. Hyperlipidemia:   Current medication regimen includes atorvastatin 10mg. She attempts to follow a low fat, low cholesterol diet. For exercise, she is walking and using the stair stepper. Past Medical History:     Past Medical History:   Diagnosis Date    Allergic rhinitis     Asthma     DM (diabetes mellitus) ([facility] [address]     Migraines       Reviewed all health maintenance requirements and ordered appropriate tests  Health Maintenance Due   Topic Date Due    Hepatitis C screen  1985    Varicella vaccine (1 of 2 - 2-dose childhood series) 10/07/1985    Diabetic retinal exam  10/07/1994    HIV screen  10/07/1999    Hepatitis B vaccine (1 of 3 - Risk 3-dose series) 10/07/2003    DTaP/Tdap/Td vaccine (1 - Tdap) 10/07/2003    Cervical cancer screen  10/07/2005    Flu vaccine (1) 09/01/2020    A1C test (Diabetic or Prediabetic)  12/26/2020       Past Surgical History:     Past Surgical History:   Procedure Laterality Date    [facility]    KNEE ARTHROSCOPY  2001    right knee    TONSILLECTOMY  1988        Medications:       Prior to Admission medications    Medication Sig Start Date End Date Taking?  Authorizing Provider   glucose monitoring kit (FREESTYLE) monitoring kit 1 kit by Does not apply route daily 1/6/21  Yes Houston Vizcaino, APRN - CNP buPROPion (WELLBUTRIN XL) 150 MG extended release tablet Take 1 tablet by mouth daily 1/6/21  Yes BRIDGETT Baires CNP   escitalopram (LEXAPRO) 20 MG tablet Take 1 tablet by mouth daily 1/6/21  Yes BRIDGETT Baires CNP   ALPRAZolam Pisek Stager) 0.25 MG tablet Take 1 tablet by mouth 3 times daily as needed for Anxiety for up to 15 days. 1/6/21 1/21/21 Yes BRIDGETT Baires CNP   dapagliflozin (FARXIGA) 10 MG tablet Take 1 tablet by mouth every morning 1/6/21  Yes BRIDGETT Baires CNP   SITagliptin (JANUVIA) 50 MG tablet Take 1 tablet by mouth daily 1/6/21  Yes BRIDGETT Baires CNP   pioglitazone (ACTOS) 45 MG tablet Take 1 tablet by mouth daily 11/4/20  Yes BRIDGETT Baires CNP   atorvastatin (LIPITOR) 10 MG tablet Take 1 tablet by mouth daily 9/29/20  Yes BRIDGETT Baires CNP   lisinopril (PRINIVIL;ZESTRIL) 5 MG tablet Take 5 mg by mouth daily 1/21/20  Yes Historical Provider, MD   levonorgestrel (MIRENA) IUD 52 mg 1 each by Intrauterine route once   Yes Historical Provider, MD   albuterol sulfate HFA (VENTOLIN HFA) 108 (90 Base) MCG/ACT inhaler INHALE 2 PUFFS BY MOUTH EVERY 6 HOURS AS NEEDED FOR WHEEZING 5/21/19  Yes Historical Provider, MD   albuterol sulfate HFA (PROAIR HFA) 108 (90 Base) MCG/ACT inhaler Inhale 2 puffs into the lungs every 6 hours as needed for Wheezing or Shortness of Breath 8/12/20  Yes BRIDGETT Baires CNP        Allergies:       Metformin, Avocado, Banana, Codeine, Cucumber extract, Food, Nut [peanut-containing drug products], Other, Seasonal, and Percocet [oxycodone-acetaminophen]    Social History:     Tobacco:    reports that she has never smoked. She has never used smokeless tobacco.  Alcohol:      reports current alcohol use. Drug Use:  reports no history of drug use.     Family History:     Family History   Problem Relation Age of Onset    Stroke Father     Heart Disease Father     Diabetes Father     High Blood Pressure Father  Diabetes type 2  Brother     Heart Attack Maternal Grandfather        Review of Systems:     Positive and Negative as described in HPI    Review of Systems   Constitutional: Positive for fatigue. Eyes: Negative for visual disturbance. Endocrine: Negative for polydipsia, polyphagia and polyuria. Psychiatric/Behavioral: Positive for dysphoric mood. Negative for suicidal ideas. The patient is nervous/anxious. Physical Exam:   Vitals:  /80   Pulse 99   Ht 5' 3\" (1.6 m)   Wt 218 lb (98.9 kg)   SpO2 99%   BMI 38.62 kg/m²     Physical Exam  Constitutional:       General: She is not in acute distress. Appearance: Normal appearance. She is obese. She is not ill-appearing or toxic-appearing. HENT:      Head: Normocephalic. Cardiovascular:      Rate and Rhythm: Normal rate and regular rhythm. Heart sounds: Normal heart sounds. No murmur. Pulmonary:      Effort: Pulmonary effort is normal. No respiratory distress. Breath sounds: Normal breath sounds. No stridor. No wheezing, rhonchi or rales. Abdominal:      General: Bowel sounds are normal. There is no distension. Palpations: Abdomen is soft. There is no mass. Tenderness: There is no abdominal tenderness. There is no guarding. Hernia: No hernia is present. Skin:     General: Skin is warm. Neurological:      Mental Status: She is alert and oriented to person, place, and time. Psychiatric:         Mood and Affect: Mood normal.         Behavior: Behavior normal.         Thought Content:  Thought content normal.         Judgment: Judgment normal.         Data:     Lab Results   Component Value Date     09/26/2020    K 3.9 09/26/2020    CL 98 09/26/2020    CO2 27 09/26/2020    BUN 8 09/26/2020    CREATININE 0.55 09/26/2020    GLUCOSE 299 09/26/2020    PROT 7.4 09/26/2020    LABALBU 4.2 09/26/2020    BILITOT 0.5 09/26/2020    ALKPHOS 55 09/26/2020    AST 18 09/26/2020    ALT 24 09/26/2020     Lab Results Component Value Date    WBC 12.0 09/26/2020    RBC 4.97 09/26/2020    HGB 14.7 09/26/2020    HCT 44.4 09/26/2020    MCV 89 09/26/2020    MCH 29.5 09/26/2020    MCHC 33.0 09/26/2020    RDW 13.4 09/26/2020     09/26/2020    MPV 8.4 09/26/2020     No results found for: TSH  Lab Results   Component Value Date    CHOL 266 09/26/2020    HDL 44 09/26/2020    LABA1C 10.4 09/26/2020       Assessment/Plan:      Diagnosis Orders   1. Type 2 diabetes mellitus without complication, without long-term current use of insulin (HCC)  ALT    AST    Basic Metabolic Panel    CBC    glucose monitoring kit (FREESTYLE) monitoring kit   2. Hyperlipidemia, unspecified hyperlipidemia type  ALT    AST    Basic Metabolic Panel    CBC    Lipid Panel   3. Positive depression screening  Positive Screen for Clinical Depression with a Documented Follow-up Plan    4. Moderate episode of recurrent major depressive disorder (HCC)  buPROPion (WELLBUTRIN XL) 150 MG extended release tablet   5. Anxiety  ALPRAZolam (XANAX) 0.25 MG tablet   6. Other hyperlipidemia       DM:   - POC A1c 11.1 today. This has increased from 10.4 on 9/2020. Supplied health counseling at length on the importance of diabetic diet compliance and physical activity. - Patient has referral to diabetic education but has not scheduled appt. - Will increase dapagliflozin from 5mg to 10mg once daily. - Will add januvia 50mg once daily with the goal of increasing to 100mg daily. Patient admits a recent change in her insurance. If this medication is not covered, she is to call the office. Encouraged her to call her insurance company to discuss coverage of DM medications. - Reviewed signs/symptoms of hypoglycemia, how to correct, and when to notify the office.   - Discussed the importance of managing her DM. If not well controlled on triple oral therapy, will consider insulin initiation.   - F/u 4 weeks.      Hyperlipidemia: - Repeat lipid panel. Will consider increasing atorvastatin dose. - Encouraged stretching and remaining well hydrated to reduce muscle aches. Anxiety/Depression:   - Symptoms not controlled. Will increase lexapro from 10mg to 20mg. - PDMP reviewed. Will refill xanax prn.   - Reviewed stress relieving activities. Completed Refills   Requested Prescriptions     Signed Prescriptions Disp Refills    glucose monitoring kit (FREESTYLE) monitoring kit 1 kit 0     Si kit by Does not apply route daily    buPROPion (WELLBUTRIN XL) 150 MG extended release tablet 60 tablet 2     Sig: Take 1 tablet by mouth daily    escitalopram (LEXAPRO) 20 MG tablet 30 tablet 2     Sig: Take 1 tablet by mouth daily    ALPRAZolam (XANAX) 0.25 MG tablet 15 tablet 0     Sig: Take 1 tablet by mouth 3 times daily as needed for Anxiety for up to 15 days.  dapagliflozin (FARXIGA) 10 MG tablet 30 tablet 2     Sig: Take 1 tablet by mouth every morning    SITagliptin (JANUVIA) 50 MG tablet 30 tablet 2     Sig: Take 1 tablet by mouth daily       Orders Placed This Encounter   Procedures    ALT     Standing Status:   Future     Standing Expiration Date:   2022    AST     Standing Status:   Future     Standing Expiration Date:   2022    Basic Metabolic Panel     Standing Status:   Future     Standing Expiration Date:   2022    CBC     Standing Status:   Future     Standing Expiration Date:   2022    Lipid Panel     Standing Status:   Future     Standing Expiration Date:   2022     Order Specific Question:   Is Patient Fasting?/# of Hours     Answer:   not fasting    Positive Screen for Clinical Depression with a Documented Follow-up Plan      Standing Status:   Future     Standing Expiration Date:   2021        No results found for this visit on 21. Return in about 4 weeks (around 2/3/2021), or if symptoms worsen or fail to improve, for DM f/u . Electronically signed by BRIDGETT Fischer CNP on 01/06/21 at 10:11 AM.

## 2021-01-06 NOTE — PATIENT INSTRUCTIONS
Increase lexapro from 10mg to 20mg once daily. Increase dapagliflozin from 5mg to 10mg once daily. SURVEY:    You may be receiving a survey from [employer] regarding your visit today. Please complete the survey to enable us to provide the highest quality of care to you and your family. If you are unable to score a \"very good' on any question, please call the office to discuss how we can improve your experience. We appreciate your feedback. Thank you!

## 2021-03-04 RX ORDER — PIOGLITAZONEHYDROCHLORIDE 45 MG/1
TABLET ORAL
Qty: 90 TABLET | Refills: 0 | Status: SHIPPED | OUTPATIENT
Start: 2021-03-04 | End: 2021-05-05 | Stop reason: ALTCHOICE

## 2021-03-04 NOTE — TELEPHONE ENCOUNTER
Attempted to reach pt., no answer, left VM to call office to get back on the schedule for DM f/u.
Rx filled. Please call patient to schedule DM f/u. She no showed her appt 2/2021. Thanks!
Asthma     Diabetes mellitus (HCC)     Anxiety     Moderate episode of recurrent major depressive disorder ([facility] [address]     Other hyperlipidemia

## 2021-04-06 RX ORDER — SITAGLIPTIN 50 MG/1
TABLET, FILM COATED ORAL
Qty: 30 TABLET | Refills: 0 | Status: SHIPPED | OUTPATIENT
Start: 2021-04-06 | End: 2021-04-08 | Stop reason: ALTCHOICE

## 2021-04-06 RX ORDER — DAPAGLIFLOZIN 10 MG/1
TABLET, FILM COATED ORAL
Qty: 30 TABLET | Refills: 0 | Status: SHIPPED | OUTPATIENT
Start: 2021-04-06 | End: 2021-05-03

## 2021-04-06 NOTE — TELEPHONE ENCOUNTER
Attempted to reach pt., no answer, left VM needs DM f/u and asked if she has seen Dietician yet, please call office.         Health Maintenance   Topic Date Due    Hepatitis C screen  Never done    Varicella vaccine (1 of 2 - 2-dose childhood series) Never done    Diabetic retinal exam  Never done    HIV screen  Never done    COVID-19 Vaccine (1) Never done    Hepatitis B vaccine (1 of 3 - Risk 3-dose series) Never done    DTaP/Tdap/Td vaccine (1 - Tdap) Never done    Cervical cancer screen  Never done    A1C test (Diabetic or Prediabetic)  12/26/2020    Flu vaccine (Season Ended) 09/01/2021    Diabetic microalbuminuria test  09/26/2021    Lipid screen  09/26/2021    Potassium monitoring  09/26/2021    Creatinine monitoring  09/26/2021    Diabetic foot exam  11/04/2021    Pneumococcal 0-64 years Vaccine  Completed    Hepatitis A vaccine  Aged Out    Hib vaccine  Aged Out    Meningococcal (ACWY) vaccine  Aged Out             (applicable per patient's age: Cancer Screenings, Depression Screening, Fall Risk Screening, Immunizations)    Hemoglobin A1C (%)   Date Value   09/26/2020 10.4 (H)     LDL Calculated (mg/dL)   Date Value   09/26/2020 172 (H)     AST (U/L)   Date Value   09/26/2020 18     ALT (U/L)   Date Value   09/26/2020 24     BUN (mg/dL)   Date Value   09/26/2020 8      (goal A1C is < 7)   (goal LDL is <100) need 30-50% reduction from baseline     BP Readings from Last 3 Encounters:   01/06/21 126/80   11/04/20 112/80   09/28/20 118/88    (goal /80)      All Future Testing planned in [facility]:  Lab Frequency Next Occurrence   Lipid Panel Once 02/24/2021   Hemoglobin A1C Once 02/24/2021   Comprehensive Metabolic Panel Once 06/39/6019   CBC With Auto Differential Once 02/24/2021   Microalbumin / Creatinine Urine Ratio Once 02/24/2021   ALT Once 02/24/2021   AST Once 15/74/2106   Basic Metabolic Panel Once 90/58/7584   CBC Once 02/24/2021   Lipid Panel Once 02/24/2021   Positive Screen for Clinical Depression with a Documented Follow-up Plan  Once 01/06/2021       Next Visit Date:  No future appointments.          Patient Active Problem List:     Asthma     Diabetes mellitus ([facility] [address]     Anxiety     Moderate episode of recurrent major depressive disorder ([facility] [address]     Other hyperlipidemia

## 2021-04-07 PROBLEM — F32.0 CURRENT MILD EPISODE OF MAJOR DEPRESSIVE DISORDER WITHOUT PRIOR EPISODE (HCC): Status: ACTIVE | Noted: 2021-04-07

## 2021-04-07 PROBLEM — F41.0 PANIC ATTACKS: Status: ACTIVE | Noted: 2018-01-12

## 2021-04-07 PROBLEM — Z30.430 ENCOUNTER FOR INSERTION OF INTRAUTERINE CONTRACEPTIVE DEVICE: Status: ACTIVE | Noted: 2017-02-16

## 2021-04-08 ENCOUNTER — OFFICE VISIT (OUTPATIENT)
Dept: FAMILY MEDICINE CLINIC | Age: 36
End: 2021-04-08
Payer: COMMERCIAL

## 2021-04-08 ENCOUNTER — HOSPITAL ENCOUNTER (OUTPATIENT)
Age: 36
Discharge: HOME | End: 2021-04-08
Payer: COMMERCIAL

## 2021-04-08 VITALS
SYSTOLIC BLOOD PRESSURE: 112 MMHG | OXYGEN SATURATION: 98 % | RESPIRATION RATE: 18 BRPM | HEART RATE: 100 BPM | BODY MASS INDEX: 39.41 KG/M2 | HEIGHT: 63 IN | DIASTOLIC BLOOD PRESSURE: 82 MMHG | WEIGHT: 222.4 LBS

## 2021-04-08 DIAGNOSIS — E11.9 TYPE 2 DIABETES MELLITUS WITHOUT COMPLICATION, WITHOUT LONG-TERM CURRENT USE OF INSULIN (HCC): Primary | ICD-10-CM

## 2021-04-08 DIAGNOSIS — E11.65 TYPE 2 DIABETES MELLITUS WITH HYPERGLYCEMIA, WITHOUT LONG-TERM CURRENT USE OF INSULIN (HCC): ICD-10-CM

## 2021-04-08 DIAGNOSIS — E11.9 TYPE 2 DIABETES MELLITUS WITHOUT COMPLICATION, WITHOUT LONG-TERM CURRENT USE OF INSULIN (HCC): ICD-10-CM

## 2021-04-08 DIAGNOSIS — F41.9 ANXIETY: ICD-10-CM

## 2021-04-08 DIAGNOSIS — E78.5 HYPERLIPIDEMIA, UNSPECIFIED HYPERLIPIDEMIA TYPE: ICD-10-CM

## 2021-04-08 DIAGNOSIS — F33.1 MODERATE EPISODE OF RECURRENT MAJOR DEPRESSIVE DISORDER (HCC): ICD-10-CM

## 2021-04-08 LAB
ABSOLUTE EOS #: 0.16 K/UL (ref 0–0.44)
ABSOLUTE IMMATURE GRANULOCYTE: 0.07 K/UL (ref 0–0.3)
ABSOLUTE LYMPH #: 3.63 K/UL (ref 1.1–3.7)
ABSOLUTE MONO #: 1.08 K/UL (ref 0.1–1.2)
ALBUMIN SERPL-MCNC: 3.9 G/DL (ref 3.5–5.2)
ALBUMIN/GLOBULIN RATIO: 1.2 (ref 1–2.5)
ALP BLD-CCNC: 63 U/L (ref 35–104)
ALT SERPL-CCNC: 22 U/L (ref 5–33)
ANION GAP SERPL CALCULATED.3IONS-SCNC: 11 MMOL/L (ref 9–17)
AST SERPL-CCNC: 14 U/L
BASOPHILS # BLD: 1 % (ref 0–2)
BASOPHILS ABSOLUTE: 0.1 K/UL (ref 0–0.2)
BILIRUB SERPL-MCNC: 0.46 MG/DL (ref 0.3–1.2)
BUN BLDV-MCNC: 11 MG/DL (ref 6–20)
BUN/CREAT BLD: 20 (ref 9–20)
CALCIUM SERPL-MCNC: 9.8 MG/DL (ref 8.6–10.4)
CHLORIDE BLD-SCNC: 101 MMOL/L (ref 98–107)
CO2: 25 MMOL/L (ref 20–31)
CREAT SERPL-MCNC: 0.56 MG/DL (ref 0.5–0.9)
CREATININE URINE: 46.3 MG/DL (ref 28–217)
DIFFERENTIAL TYPE: ABNORMAL
EOSINOPHILS RELATIVE PERCENT: 1 % (ref 1–4)
ESTIMATED AVERAGE GLUCOSE: 229 MG/DL
GFR AFRICAN AMERICAN: >60 ML/MIN
GFR NON-AFRICAN AMERICAN: >60 ML/MIN
GFR SERPL CREATININE-BSD FRML MDRD: ABNORMAL ML/MIN/{1.73_M2}
GFR SERPL CREATININE-BSD FRML MDRD: ABNORMAL ML/MIN/{1.73_M2}
GLUCOSE BLD-MCNC: 194 MG/DL (ref 70–99)
HBA1C MFR BLD: 10.6 %
HBA1C MFR BLD: 9.6 % (ref 4–6)
HCT VFR BLD CALC: 43 % (ref 36.3–47.1)
HEMOGLOBIN: 14.2 G/DL (ref 11.9–15.1)
IMMATURE GRANULOCYTES: 1 %
LYMPHOCYTES # BLD: 26 % (ref 24–43)
MCH RBC QN AUTO: 29.8 PG (ref 25.2–33.5)
MCHC RBC AUTO-ENTMCNC: 33 G/DL (ref 28.4–34.8)
MCV RBC AUTO: 90.3 FL (ref 82.6–102.9)
MICROALBUMIN/CREAT 24H UR: <12 MG/L
MICROALBUMIN/CREAT UR-RTO: NORMAL MCG/MG CREAT
MONOCYTES # BLD: 8 % (ref 3–12)
NRBC AUTOMATED: 0 PER 100 WBC
PDW BLD-RTO: 12.9 % (ref 11.8–14.4)
PLATELET # BLD: 394 K/UL (ref 138–453)
PLATELET ESTIMATE: ABNORMAL
PMV BLD AUTO: 9.7 FL (ref 8.1–13.5)
POTASSIUM SERPL-SCNC: 4.1 MMOL/L (ref 3.7–5.3)
RBC # BLD: 4.76 M/UL (ref 3.95–5.11)
RBC # BLD: ABNORMAL 10*6/UL
SEG NEUTROPHILS: 63 % (ref 36–65)
SEGMENTED NEUTROPHILS ABSOLUTE COUNT: 8.79 K/UL (ref 1.5–8.1)
SODIUM BLD-SCNC: 137 MMOL/L (ref 135–144)
TOTAL PROTEIN: 7.1 G/DL (ref 6.4–8.3)
WBC # BLD: 13.8 K/UL (ref 3.5–11.3)
WBC # BLD: ABNORMAL 10*3/UL

## 2021-04-08 PROCEDURE — 80061 LIPID PANEL: CPT

## 2021-04-08 PROCEDURE — 36415 COLL VENOUS BLD VENIPUNCTURE: CPT

## 2021-04-08 PROCEDURE — 83036 HEMOGLOBIN GLYCOSYLATED A1C: CPT

## 2021-04-08 PROCEDURE — 82570 ASSAY OF URINE CREATININE: CPT

## 2021-04-08 PROCEDURE — 85025 COMPLETE CBC W/AUTO DIFF WBC: CPT

## 2021-04-08 PROCEDURE — 83036 HEMOGLOBIN GLYCOSYLATED A1C: CPT | Performed by: NURSE PRACTITIONER

## 2021-04-08 PROCEDURE — 82043 UR ALBUMIN QUANTITATIVE: CPT

## 2021-04-08 PROCEDURE — 99214 OFFICE O/P EST MOD 30 MIN: CPT | Performed by: NURSE PRACTITIONER

## 2021-04-08 PROCEDURE — 80053 COMPREHEN METABOLIC PANEL: CPT

## 2021-04-08 RX ORDER — METHYLPREDNISOLONE 4 MG
TABLET, DOSE PACK ORAL
COMMUNITY
End: 2022-10-19 | Stop reason: ALTCHOICE

## 2021-04-08 RX ORDER — BUPROPION HYDROCHLORIDE 150 MG/1
150 TABLET ORAL DAILY
Qty: 90 TABLET | Refills: 1 | Status: SHIPPED | OUTPATIENT
Start: 2021-04-08 | End: 2022-03-25

## 2021-04-08 RX ORDER — DULAGLUTIDE 0.75 MG/.5ML
0.75 INJECTION, SOLUTION SUBCUTANEOUS WEEKLY
Qty: 4 PEN | Refills: 0 | Status: SHIPPED | OUTPATIENT
Start: 2021-04-08 | End: 2021-05-03

## 2021-04-08 NOTE — PROGRESS NOTES
Name: Raheel Napoleon  : 1985         Chief Complaint:     Chief Complaint   Patient presents with    Diabetes     3 month f/u, A1C in office today, she is fasting and will complete rest of open labs. She reports BS running 100-120, denies hypoglycemia, neuropathy, or visual changes (sees [facility], next appt. 2021)       History of Present Illness:      Amanda Real is a 38 y.o.  female who presents with Diabetes (3 month f/u, A1C in office today, she is fasting and will complete rest of open labs. She reports BS running 100-120, denies hypoglycemia, neuropathy, or visual changes (sees [facility], next appt. 2021))      HPI    DM:   Diabetic diet/low carb diet compliance: Following low sugar, low carb, high protein diet. Current exercise: Gym with cardio and weight lifting  Most recent A1c: 11.1% on 2020  Diabetes Education: Referral in place. She denies attendance due to financial concerns  Frequency of exercise: 3 times per week  Frequency of glucose testing at home: three days per week  Glucometer at home: yes  Fasting glucose: 120  History of hypoglycemic episodes: One episode several weeks ago, states she did not eat anything during that time. Symptoms included dizziness, nausea, and lightheadedness. Most recent eye examination: 2019. Upcoming appointment 2021. Diabetic foot check in the past year Yes. Last diabetic foot check: 2020  Current symptoms: Denies excessive thirst or increased frequency of urination. Denies numbness/tingling in hands or feet, or major changes in weight. Denies vision changes or sores that are slow to heal. Admits fatigue. Admits episodes of excessive hunger. reports that she has never smoked. She has never used smokeless tobacco.   Medication compliance:  compliant all of the time  Medication Therapy: pioglitazone 45mg once daily, januvia 50mg, dapagliflozin 10mg. She notes an allergic reaction to metformin including hives and vomiting. Past Medical History:     Past Medical History:   Diagnosis Date    Allergic rhinitis     Asthma     DM (diabetes mellitus) ([facility] [address]     Migraines       Reviewed all health maintenance requirements and ordered appropriate tests  Health Maintenance Due   Topic Date Due    Hepatitis C screen  Never done    Diabetic retinal exam  Never done    HIV screen  Never done    Cervical cancer screen  Never done       Past Surgical History:     Past Surgical History:   Procedure Laterality Date    [facility]  2007    [facility]    KNEE ARTHROSCOPY  2001    right knee    TONSILLECTOMY  1988        Medications:       Prior to Admission medications    Medication Sig Start Date End Date Taking?  Authorizing Provider   Multiple Vitamins-Minerals (BODY/HAIR/SKIN/NAILS) CAPS Take by mouth   Yes Historical Provider, MD   buPROPion (WELLBUTRIN XL) 150 MG extended release tablet Take 1 tablet by mouth daily 4/8/21  Yes BRIDGETT Taylor CNP   Dulaglutide (TRULICITY) 8.87 JH/9.8KZ SOPN Inject 0.75 mg into the skin once a week 4/8/21  Yes BRIDGETT Taylor CNP   SITagliptin (JANUVIA) 100 MG tablet Take 1 tablet by mouth daily 4/8/21  Yes BRIDGETT Taylor CNP   FARXIGA 10 MG tablet TAKE 1 TABLET BY MOUTH EVERY DAY IN THE MORNING 4/6/21  Yes BRIDGETT Taylor CNP   pioglitazone (ACTOS) 45 MG tablet TAKE 1 TABLET BY MOUTH EVERY DAY 3/4/21  Yes BRIDGETT Taylor CNP   escitalopram (LEXAPRO) 20 MG tablet TAKE 1 TABLET BY MOUTH EVERY DAY 2/1/21  Yes BRIDGETT Taylor CNP   atorvastatin (LIPITOR) 10 MG tablet TAKE 1 TABLET BY MOUTH EVERY DAY 1/25/21  Yes BRIDGETT Taylor CNP   glucose monitoring kit (FREESTYLE) monitoring kit 1 kit by Does not apply route daily 1/6/21  Yes BRIDGETT Taylor CNP   lisinopril (PRINIVIL;ZESTRIL) 5 MG tablet Take 5 mg by mouth daily 1/21/20  Yes Historical Provider, MD   levonorgestrel (MIRENA) IUD 52 mg 1 each by Intrauterine route once   Yes Historical Provider, MD   albuterol sulfate HFA (PROAIR HFA) 108 (90 Base) MCG/ACT inhaler Inhale 2 puffs into the lungs every 6 hours as needed for Wheezing or Shortness of Breath 8/12/20  Yes Malinda Harrington, APRN - CNP        Allergies:       Metformin, Avocado, Banana, Codeine, Cucumber extract, Food, Nut [peanut-containing drug products], Other, Seasonal, and Percocet [oxycodone-acetaminophen]    Social History:     Tobacco:    reports that she has never smoked. She has never used smokeless tobacco.  Alcohol:      reports current alcohol use. Drug Use:  reports no history of drug use. Family History:     Family History   Problem Relation Age of Onset    Stroke Father     Heart Disease Father     Diabetes Father     High Blood Pressure Father     Diabetes type 2  Brother     Heart Attack Maternal Grandfather        Review of Systems:     Positive and Negative as described in HPI    Review of Systems   Endocrine: Positive for polyphagia. Negative for polydipsia and polyuria. Neurological: Negative for numbness. Psychiatric/Behavioral:        Admits her mood has improved since getting the covid vaccination and being able to be more social and return to the gym. She denies interest in adjusting psych medications or their dosages as she notes improvement in her symptoms. Physical Exam:   Vitals:  /82 (Site: Left Upper Arm, Position: Sitting, Cuff Size: Large Adult)   Pulse 100   Resp 18   Ht 5' 3\" (1.6 m)   Wt 222 lb 6.4 oz (100.9 kg)   SpO2 98%   BMI 39.40 kg/m²     Physical Exam  Constitutional:       General: She is not in acute distress. Appearance: Normal appearance. She is obese. She is not ill-appearing or diaphoretic. HENT:      Head: Normocephalic. Cardiovascular:      Rate and Rhythm: Regular rhythm. Tachycardia present. Heart sounds: Normal heart sounds. No murmur. Pulmonary:      Effort: Pulmonary effort is normal. No respiratory distress.       Breath sounds: Normal breath sounds. No stridor. No wheezing, rhonchi or rales. Skin:     General: Skin is warm. Neurological:      Mental Status: She is alert and oriented to person, place, and time. Psychiatric:         Mood and Affect: Mood normal.         Behavior: Behavior normal.         Thought Content: Thought content normal.         Judgment: Judgment normal.         Data:     Lab Results   Component Value Date     09/26/2020    K 3.9 09/26/2020    CL 98 09/26/2020    CO2 27 09/26/2020    BUN 8 09/26/2020    CREATININE 0.55 09/26/2020    GLUCOSE 299 09/26/2020    PROT 7.4 09/26/2020    LABALBU 4.2 09/26/2020    BILITOT 0.5 09/26/2020    ALKPHOS 55 09/26/2020    AST 18 09/26/2020    ALT 24 09/26/2020     Lab Results   Component Value Date    WBC 13.8 04/08/2021    RBC 4.76 04/08/2021    RBC 4.97 09/26/2020    HGB 14.2 04/08/2021    HCT 43.0 04/08/2021    MCV 90.3 04/08/2021    MCH 29.8 04/08/2021    MCHC 33.0 04/08/2021    RDW 12.9 04/08/2021     04/08/2021    MPV 9.7 04/08/2021     No results found for: TSH  Lab Results   Component Value Date    CHOL 266 09/26/2020    HDL 44 09/26/2020    LABA1C 10.6 04/08/2021       Assessment/Plan:      Diagnosis Orders   1. Type 2 diabetes mellitus without complication, without long-term current use of insulin (HCC)  POCT glycosylated hemoglobin (Hb A1C)   2. Moderate episode of recurrent major depressive disorder (HCC)  buPROPion (WELLBUTRIN XL) 150 MG extended release tablet   3. Anxiety         Wellness:   -Discussed need for Pap smear. Patient states she will schedule with women's health clinic    Anxiety:  -Stable. No change in medication at this time. DM:  -POC A1c 10.6 today. Discussed that this is considered uncontrolled. Reviewed risks related to uncontrolled diabetes.  -Discussed diabetic diet and routine exercise at length. Reviewed importance of weight loss in the treatment of uncontrolled diabetes.   -Continue Farxiga 10 mg daily  -Increase sitagliptin (Januvia) from 50 mg to 100 mg daily  -I would like to switch the patient from Actos 45 mg to Trulicity 0.76 mg once weekly. Discussed with the patient that we may need to work in conjunction with insurance company to have Trulicity covered which may take days-weeks. She is to continue Actos until we can confirm Trulicity or an alternative medication is covered. We will notify patient when to discontinue Actos and initiate new medication. Patient agreeable to plan. -Follow-up 3 months with repeat A1c. If DM remains uncontrolled despite triple therapy, will consider adding basal insulin. Completed Refills   Requested Prescriptions     Signed Prescriptions Disp Refills    buPROPion (WELLBUTRIN XL) 150 MG extended release tablet 90 tablet 1     Sig: Take 1 tablet by mouth daily    Dulaglutide (TRULICITY) 9.33 AUDREY/8.8WY SOPN 4 pen 0     Sig: Inject 0.75 mg into the skin once a week    SITagliptin (JANUVIA) 100 MG tablet 30 tablet 5     Sig: Take 1 tablet by mouth daily       Orders Placed This Encounter   Procedures    POCT glycosylated hemoglobin (Hb A1C)        Results for POC orders placed in visit on 04/08/21   POCT glycosylated hemoglobin (Hb A1C)   Result Value Ref Range    Hemoglobin A1C 10.6 %       Return in about 3 months (around 7/8/2021), or if symptoms worsen or fail to improve, for DM f/u with A1c.     Electronically signed by BRIDGETT Sloan CNP on 04/08/21 at 11:21 AM.

## 2021-04-08 NOTE — PATIENT INSTRUCTIONS
Increase Januvia from 50mg to 100mg daily    SURVEY:    You may be receiving a survey from [employer] regarding your visit today. Please complete the survey to enable us to provide the highest quality of care to you and your family. If you cannot score us a very good on any question, please call the office to discuss how we could have made your experience a very good one. Thank you.

## 2021-04-09 LAB
CHOLESTEROL/HDL RATIO: 3.6
CHOLESTEROL: 144 MG/DL
HDLC SERPL-MCNC: 40 MG/DL
LDL CHOLESTEROL: 82 MG/DL (ref 0–130)
TRIGL SERPL-MCNC: 112 MG/DL
VLDLC SERPL CALC-MCNC: ABNORMAL MG/DL (ref 1–30)

## 2021-04-12 ENCOUNTER — TELEPHONE (OUTPATIENT)
Dept: FAMILY MEDICINE CLINIC | Age: 36
End: 2021-04-12

## 2021-04-12 DIAGNOSIS — D72.829 LEUKOCYTOSIS, UNSPECIFIED TYPE: Primary | ICD-10-CM

## 2021-04-12 NOTE — TELEPHONE ENCOUNTER
Patient notified of lab results from 4/8/2021 including A1c of 9.6. Patient confirms she was able to obtain Trulicity and administered her first dose yesterday. She has discontinued pioglitazone 45 mg and I encouraged her to remain off of this medication. She will continue Januvia 100 mg daily and Farxiga 10 mg daily. She will call office when Trulicity refill is needed. Follow-up in office in 3 months with repeat A1c or sooner if concerns arise. Repeat CBC in 4 weeks d/t elevated WBC.

## 2021-05-03 RX ORDER — ESCITALOPRAM OXALATE 20 MG/1
TABLET ORAL
Qty: 90 TABLET | Refills: 1 | Status: SHIPPED | OUTPATIENT
Start: 2021-05-03 | End: 2021-11-01

## 2021-05-03 RX ORDER — DAPAGLIFLOZIN 10 MG/1
TABLET, FILM COATED ORAL
Qty: 30 TABLET | Refills: 0 | Status: SHIPPED | OUTPATIENT
Start: 2021-05-03 | End: 2021-06-01

## 2021-05-03 RX ORDER — DULAGLUTIDE 0.75 MG/.5ML
0.75 INJECTION, SOLUTION SUBCUTANEOUS WEEKLY
Qty: 4 PEN | Refills: 0 | Status: SHIPPED | OUTPATIENT
Start: 2021-05-03 | End: 2021-05-05 | Stop reason: ALTCHOICE

## 2021-05-05 ENCOUNTER — TELEPHONE (OUTPATIENT)
Dept: FAMILY MEDICINE CLINIC | Age: 36
End: 2021-05-05

## 2021-05-05 RX ORDER — DULAGLUTIDE 1.5 MG/.5ML
1.5 INJECTION, SOLUTION SUBCUTANEOUS WEEKLY
Qty: 6 PEN | Refills: 1 | Status: SHIPPED | OUTPATIENT
Start: 2021-05-05 | End: 2021-08-03

## 2021-05-05 NOTE — TELEPHONE ENCOUNTER
Pt called in for her 4 week check in on Trulicity.    -She states her readings are averaging 100-120  -And over all no side effects other then having no appetite and always feeling full. Pt confirms she has d/c'd the Actos 45  Also she is taking Farxiga 10 mg and Januvia 100 mg    She is currently taking Trulicity 0.88 and needs to increase to 1.5 mg weekly. Please send to [facility] in [place].

## 2021-05-19 ENCOUNTER — TELEPHONE (OUTPATIENT)
Dept: FAMILY MEDICINE CLINIC | Age: 36
End: 2021-05-19

## 2021-05-19 NOTE — TELEPHONE ENCOUNTER
Attempted to reach pt., no answer, left VM to return call. Needs to complete open labs, see phone note from 04/12/21. Orders extended one more week.

## 2021-07-18 ENCOUNTER — HOSPITAL ENCOUNTER (EMERGENCY)
Age: 36
Discharge: HOME | End: 2021-07-18
Attending: EMERGENCY MEDICINE
Payer: COMMERCIAL

## 2021-07-18 VITALS
TEMPERATURE: 97.9 F | HEIGHT: 63 IN | BODY MASS INDEX: 36.86 KG/M2 | DIASTOLIC BLOOD PRESSURE: 77 MMHG | SYSTOLIC BLOOD PRESSURE: 141 MMHG | OXYGEN SATURATION: 98 % | HEART RATE: 93 BPM | RESPIRATION RATE: 18 BRPM | WEIGHT: 208 LBS

## 2021-07-18 DIAGNOSIS — R11.2 NON-INTRACTABLE VOMITING WITH NAUSEA, UNSPECIFIED VOMITING TYPE: Primary | ICD-10-CM

## 2021-07-18 LAB
CHP ED QC CHECK: NORMAL
GLUCOSE BLD-MCNC: 212 MG/DL
GLUCOSE BLD-MCNC: 212 MG/DL (ref 65–105)

## 2021-07-18 PROCEDURE — 96374 THER/PROPH/DIAG INJ IV PUSH: CPT

## 2021-07-18 PROCEDURE — 99284 EMERGENCY DEPT VISIT MOD MDM: CPT

## 2021-07-18 PROCEDURE — 96361 HYDRATE IV INFUSION ADD-ON: CPT

## 2021-07-18 PROCEDURE — 82947 ASSAY GLUCOSE BLOOD QUANT: CPT

## 2021-07-18 PROCEDURE — 6360000002 HC RX W HCPCS: Performed by: EMERGENCY MEDICINE

## 2021-07-18 PROCEDURE — 2580000003 HC RX 258: Performed by: EMERGENCY MEDICINE

## 2021-07-18 RX ORDER — ONDANSETRON 4 MG/1
4 TABLET, ORALLY DISINTEGRATING ORAL EVERY 8 HOURS PRN
Qty: 20 TABLET | Refills: 0 | Status: SHIPPED | OUTPATIENT
Start: 2021-07-18 | End: 2022-02-11

## 2021-07-18 RX ORDER — ONDANSETRON 2 MG/ML
4 INJECTION INTRAMUSCULAR; INTRAVENOUS ONCE
Status: COMPLETED | OUTPATIENT
Start: 2021-07-18 | End: 2021-07-18

## 2021-07-18 RX ORDER — 0.9 % SODIUM CHLORIDE 0.9 %
1000 INTRAVENOUS SOLUTION INTRAVENOUS ONCE
Status: COMPLETED | OUTPATIENT
Start: 2021-07-18 | End: 2021-07-18

## 2021-07-18 RX ADMIN — ONDANSETRON 4 MG: 2 INJECTION INTRAMUSCULAR; INTRAVENOUS at 17:27

## 2021-07-18 RX ADMIN — SODIUM CHLORIDE 1000 ML: 9 INJECTION, SOLUTION INTRAVENOUS at 17:27

## 2021-07-18 NOTE — ED PROVIDER NOTES
MCG/ACT INHALER    Inhale 2 puffs into the lungs every 6 hours as needed for Wheezing or Shortness of Breath    ATORVASTATIN (LIPITOR) 10 MG TABLET    TAKE 1 TABLET BY MOUTH EVERY DAY    BUPROPION (WELLBUTRIN XL) 150 MG EXTENDED RELEASE TABLET    Take 1 tablet by mouth daily    DULAGLUTIDE (TRULICITY) 1.5 AH/9.3OF SOPN    Inject 1.5 mg into the skin once a week    ESCITALOPRAM (LEXAPRO) 20 MG TABLET    TAKE 1 TABLET BY MOUTH EVERY DAY    FARXIGA 10 MG TABLET    TAKE 1 TABLET BY MOUTH EVERY DAY IN THE MORNING    GLUCOSE MONITORING KIT (FREESTYLE) MONITORING KIT    1 kit by Does not apply route daily    LEVONORGESTREL (MIRENA) IUD 52 MG    1 each by Intrauterine route once    LISINOPRIL (PRINIVIL;ZESTRIL) 5 MG TABLET    Take 5 mg by mouth daily    MULTIPLE VITAMINS-MINERALS (BODY/HAIR/SKIN/NAILS) CAPS    Take by mouth    SITAGLIPTIN (JANUVIA) 100 MG TABLET    Take 1 tablet by mouth daily       ALLERGIES     is allergic to metformin, avocado, banana, codeine, cucumber extract, food, nut [peanut-containing drug products], other, seasonal, and percocet [oxycodone-acetaminophen]. FAMILY HISTORY     She indicated that her mother is . She indicated that her father is . She indicated that her sister is alive. She indicated that her brother is alive. She indicated that her maternal grandfather is . family history includes Diabetes in her father; Diabetes type 2  in her brother; Heart Attack in her maternal grandfather; Heart Disease in her father; High Blood Pressure in her father; Stroke in her father. SOCIAL HISTORY      reports that she has never smoked. She has never used smokeless tobacco. She reports current alcohol use. She reports that she does not use drugs. PHYSICAL EXAM     INITIAL VITALS:  height is 5' 3\" (1.6 m) and weight is 94.3 kg (208 lb). Her oral temperature is 97.9 °F (36.6 °C). Her blood pressure is 138/98 (abnormal) and her pulse is 112.  Her respiration is 18 and 1. Non-intractable vomiting with nausea, unspecified vomiting type          DISPOSITION/PLAN   DISPOSITION Decision To Discharge 07/18/2021 06:06:11 PM      Condition on Disposition    good    PATIENT REFERRED TO:  Geeta Arriola [id number] [phone]    In 1 week  As needed      DISCHARGE MEDICATIONS:  New Prescriptions    ONDANSETRON (ZOFRAN ODT) 4 MG DISINTEGRATING TABLET    Take 1 tablet by mouth every 8 hours as needed for Nausea       (Please note that portions of this note were completed with a voice recognition program.  Efforts were made to edit the dictations but occasionally words are mis-transcribed.)    Edgar Roblero MD,, MD   Attending Emergency Physician         Nicole Maldonado MD  07/18/21 [id number]

## 2021-07-20 ENCOUNTER — OFFICE VISIT (OUTPATIENT)
Dept: FAMILY MEDICINE CLINIC | Age: 36
End: 2021-07-20
Payer: COMMERCIAL

## 2021-07-20 VITALS
HEIGHT: 63 IN | OXYGEN SATURATION: 97 % | TEMPERATURE: 97.8 F | DIASTOLIC BLOOD PRESSURE: 80 MMHG | BODY MASS INDEX: 38.45 KG/M2 | SYSTOLIC BLOOD PRESSURE: 130 MMHG | HEART RATE: 111 BPM | WEIGHT: 217 LBS

## 2021-07-20 DIAGNOSIS — E11.9 TYPE 2 DIABETES MELLITUS WITHOUT COMPLICATION, WITHOUT LONG-TERM CURRENT USE OF INSULIN (HCC): Primary | ICD-10-CM

## 2021-07-20 DIAGNOSIS — R19.7 DIARRHEA, UNSPECIFIED TYPE: ICD-10-CM

## 2021-07-20 LAB — HBA1C MFR BLD: 8.5 %

## 2021-07-20 PROCEDURE — 83036 HEMOGLOBIN GLYCOSYLATED A1C: CPT | Performed by: NURSE PRACTITIONER

## 2021-07-20 PROCEDURE — 3052F HG A1C>EQUAL 8.0%<EQUAL 9.0%: CPT | Performed by: NURSE PRACTITIONER

## 2021-07-20 PROCEDURE — 99214 OFFICE O/P EST MOD 30 MIN: CPT | Performed by: NURSE PRACTITIONER

## 2021-07-20 RX ORDER — AMOXICILLIN 500 MG/1
TABLET, FILM COATED ORAL
COMMUNITY
Start: 2021-07-13 | End: 2022-02-11

## 2021-07-20 ASSESSMENT — ENCOUNTER SYMPTOMS
VOMITING: 1
NAUSEA: 1
COUGH: 1
DIARRHEA: 1

## 2021-07-20 NOTE — PATIENT INSTRUCTIONS
SURVEY:    You may be receiving a survey from [employer] regarding your visit today. Please complete the survey to enable us to provide the highest quality of care to you and your family. If you cannot score us a very good on any question, please call the office to discuss how we could of made your experience a very good one. Thank you.

## 2021-07-20 NOTE — PROGRESS NOTES
Name: Gabriella Lozoya  : 1985         Chief Complaint:     Chief Complaint   Patient presents with    Follow-up    Cough     patient started on  with cough,     Nausea    Emesis     unable to keep water down. very weak. went to the ER  night. starting nausea meds today.  Otalgia    Diarrhea       History of Present Illness:      Amanda Real is a 38 y.o.  female who presents with Follow-up, Cough (patient started on  with cough, ), Nausea, Emesis (unable to keep water down. very weak. went to the ER  night. starting nausea meds today. ), Otalgia, and Diarrhea      HPI     DM:  Diabetic diet/low carb diet compliance: Following low carb and low sugar  Current exercise: Gym 4 days per week. Weights, cardio, treadmill  Frequency of glucose testing at home: TID  Glucometer at home: yes  Fasting glucose: 150 after dinner, fasting in the morning 90  History of hypoglycemic episodes: Admits hypoglycemic episodes when skipping meals. Occur 1-2 monthly. Most recent eye examination: 2021  Diabetic foot check in the past year Yes. Last diabetic foot check: 2020  Current symptoms: Denies excessive thirst, hunger, or increased frequency of urination. Denies numbness/tingling in hands or feet, or major changes in weight. Denies vision changes or sores that are slow to heal.    reports that she has never smoked. She has never used smokeless tobacco.   Medication compliance:  compliant all of the time  Medication Therapy: farxiga 86QK QD, trulicity 5.5TU weekly, januvia 100mg QD  Hemoglobin A1C (%)   Date Value   2021 8.5   2021 9.6 (H)   2021 10.6   2020 10.4 (H)      Vomiting:   Patient admits symptoms of nausea, vomiting, and diarrhea that began 3 days ago. She was taking amoxicillin for dental procedure at this time and admits to having single alcoholic beverage.   She was evaluated in the emergency department on 2021 was given IV fluids and Zofran and discharged under good conditions, per ED note. Today, she states that her symptoms have improved. She continues to have some nausea. Vomiting has resolved. She admits improving diarrhea, last episode yesterday. She admits a cough. Denies fever or chills. Denies sick contacts. Past Medical History:     Past Medical History:   Diagnosis Date    Allergic rhinitis     Anxiety     Asthma     Depression     DM (diabetes mellitus) ([address]     Hyperlipidemia     Hypertension     Migraines       Reviewed all health maintenance requirements and ordered appropriate tests  Health Maintenance Due   Topic Date Due    Hepatitis C screen  Never done    HIV screen  Never done    Cervical cancer screen  Never done       Past Surgical History:     Past Surgical History:   Procedure Laterality Date    [facility]    KNEE ARTHROSCOPY  2001    right knee    TONSILLECTOMY  1988        Medications:       Prior to Admission medications    Medication Sig Start Date End Date Taking?  Authorizing Provider   ondansetron (ZOFRAN ODT) 4 MG disintegrating tablet Take 1 tablet by mouth every 8 hours as needed for Nausea 7/18/21  Yes Morgan Griffin MD   FARXIGA 10 MG tablet TAKE 1 TABLET BY MOUTH EVERY DAY IN THE MORNING 6/1/21  Yes BRIDGETT Zhou CNP   Dulaglutide (TRULICITY) 1.5 IG/2.9KE SOPN Inject 1.5 mg into the skin once a week 5/5/21  Yes BRIDGETT Zhou CNP   escitalopram (LEXAPRO) 20 MG tablet TAKE 1 TABLET BY MOUTH EVERY DAY 5/3/21  Yes BRIDGETT Zhou CNP   Multiple Vitamins-Minerals (BODY/HAIR/SKIN/NAILS) CAPS Take by mouth   Yes Historical Provider, MD   buPROPion (WELLBUTRIN XL) 150 MG extended release tablet Take 1 tablet by mouth daily 4/8/21  Yes BRIDGETT Zhou CNP   SITagliptin (JANUVIA) 100 MG tablet Take 1 tablet by mouth daily 4/8/21  Yes BRIDGETT Zhou CNP   atorvastatin (LIPITOR) 10 MG tablet TAKE 1 TABLET BY MOUTH EVERY DAY 1/25/21 Yes BRIDGETT Blankenship CNP   glucose monitoring kit (FREESTYLE) monitoring kit 1 kit by Does not apply route daily 1/6/21  Yes BRIDGETT Blankenship CNP   lisinopril (PRINIVIL;ZESTRIL) 5 MG tablet Take 5 mg by mouth daily 1/21/20  Yes Historical Provider, MD   levonorgestrel (MIRENA) IUD 52 mg 1 each by Intrauterine route once   Yes Historical Provider, MD   albuterol sulfate HFA (PROAIR HFA) 108 (90 Base) MCG/ACT inhaler Inhale 2 puffs into the lungs every 6 hours as needed for Wheezing or Shortness of Breath 8/12/20  Yes BRIDGETT Blankenship CNP   Amoxicillin 500 MG TABS TAKE 1 TABLET BY MOUTH THREE TIMES A DAY 7/13/21   Historical Provider, MD        Allergies:       Metformin, Avocado, Banana, Codeine, Cucumber extract, Food, Nut [peanut-containing drug products], Other, Seasonal, and Percocet [oxycodone-acetaminophen]    Social History:     Tobacco:    reports that she has never smoked. She has never used smokeless tobacco.  Alcohol:      reports current alcohol use. Drug Use:  reports no history of drug use. Family History:     Family History   Problem Relation Age of Onset    Stroke Father     Heart Disease Father     Diabetes Father     High Blood Pressure Father     Diabetes type 2  Brother     Heart Attack Maternal Grandfather        Review of Systems:     Positive and Negative as described in HPI    Review of Systems   Constitutional: Negative for chills and fever. Respiratory: Positive for cough. Gastrointestinal: Positive for diarrhea, nausea and vomiting. Physical Exam:   Vitals:  /80   Pulse 111   Temp 97.8 °F (36.6 °C)   Ht 5' 3\" (1.6 m)   Wt 217 lb (98.4 kg)   SpO2 97%   BMI 38.44 kg/m²     Physical Exam  Constitutional:       General: She is not in acute distress. Appearance: Normal appearance. She is obese. She is not ill-appearing or toxic-appearing. HENT:      Head: Normocephalic.       Right Ear: Tympanic membrane, ear canal and external ear normal.      Left Ear: Tympanic membrane, ear canal and external ear normal.      Nose: Nose normal. No congestion or rhinorrhea. Mouth/Throat:      Mouth: Mucous membranes are moist.      Pharynx: No oropharyngeal exudate or posterior oropharyngeal erythema. Eyes:      Comments: Small left subconjunctival hemorrhage   Cardiovascular:      Rate and Rhythm: Normal rate and regular rhythm. Heart sounds: Normal heart sounds. No murmur heard. Pulmonary:      Effort: Pulmonary effort is normal. No respiratory distress. Breath sounds: Normal breath sounds. No stridor. No wheezing, rhonchi or rales. Abdominal:      General: Abdomen is flat. Bowel sounds are normal. There is no distension. Palpations: Abdomen is soft. There is no mass. Tenderness: There is no abdominal tenderness. There is no guarding. Hernia: No hernia is present. Musculoskeletal:      Cervical back: Neck supple. Lymphadenopathy:      Cervical: No cervical adenopathy. Neurological:      Mental Status: She is alert and oriented to person, place, and time. Psychiatric:         Mood and Affect: Mood normal.         Behavior: Behavior normal.         Thought Content:  Thought content normal.         Judgment: Judgment normal.         Data:     Lab Results   Component Value Date     04/08/2021    K 4.1 04/08/2021     04/08/2021    CO2 25 04/08/2021    BUN 11 04/08/2021    CREATININE 0.56 04/08/2021    GLUCOSE 212 07/18/2021    GLUCOSE 299 09/26/2020    PROT 7.1 04/08/2021    LABALBU 3.9 04/08/2021    BILITOT 0.46 04/08/2021    ALKPHOS 63 04/08/2021    AST 14 04/08/2021    ALT 22 04/08/2021     Lab Results   Component Value Date    WBC 13.8 04/08/2021    RBC 4.76 04/08/2021    RBC 4.97 09/26/2020    HGB 14.2 04/08/2021    HCT 43.0 04/08/2021    MCV 90.3 04/08/2021    MCH 29.8 04/08/2021    MCHC 33.0 04/08/2021    RDW 12.9 04/08/2021     04/08/2021    MPV 9.7 04/08/2021     No results found for: TSH  Lab Results   Component Value Date    CHOL 144 04/08/2021    HDL 40 04/08/2021    LABA1C 8.5 07/20/2021       Assessment/Plan:      Diagnosis Orders   1. Type 2 diabetes mellitus without complication, without long-term current use of insulin ([facility])  POCT glycosylated hemoglobin (Hb A1C)   2. Diarrhea, unspecified type       Diabetes:  POC A1c today 8.5. Discussed A1c trends as noted above with the patient and praised her for her lifestyle modifications. Discussed A1c goals and importance of continuing diabetic diet and routine physical exercise. Patient notes concerns with Rosario Main being covered by insurance. She will check cost with new insurance company and notify office if they are not covered as I am happy to complete a prior authorization or alter medications to best suit patient. Follow-up 4 months with repeat POC A1c    N/V/D:  DDx: Food poisoning versus viral gastroenteritis versus side effect of amoxicillin  Improving. Encouraged bland diet and increased fluid intake  Notify office of worsening symptoms including fever  Continue Zofran as needed      Completed Refills   Requested Prescriptions      No prescriptions requested or ordered in this encounter       Orders Placed This Encounter   Procedures    POCT glycosylated hemoglobin (Hb A1C)        Results for POC orders placed in visit on 07/20/21   POCT glycosylated hemoglobin (Hb A1C)   Result Value Ref Range    Hemoglobin A1C 8.5 %       Return in about 4 months (around 11/20/2021), or if symptoms worsen or fail to improve, for DM & anxiety f/u with POC A1c.     Electronically signed by BRIDGETT Geller CNP on 07/20/21 at 10:53 AM.

## 2021-07-30 ENCOUNTER — TELEPHONE (OUTPATIENT)
Dept: FAMILY MEDICINE CLINIC | Age: 36
End: 2021-07-30

## 2021-07-30 NOTE — TELEPHONE ENCOUNTER
Pt calls in stating with her new insurance they do not cover her Januvia and Labeatriza Cords and would cost her $800+ which is unaffordable she request an alternative sent in to [facility] I also asked her to call her insurance to ask what would be covered and to have it faxed or to call us with an update.

## 2021-08-02 NOTE — TELEPHONE ENCOUNTER
I recommend she check with her insurance company if they cover any medications in the drug class \"SGLT-2 inhibitors\" or \"DPP-4 Inhibitors\".

## 2021-08-03 RX ORDER — CHLORHEXIDINE GLUCONATE 0.12 MG/ML
RINSE ORAL
COMMUNITY
Start: 2021-07-13 | End: 2022-02-11

## 2021-08-03 NOTE — TELEPHONE ENCOUNTER
Pt calls stating she has clarified with her insurance that they will cover 90 day supply only of the [place] [address]. She is now asking for new scripts for 90 days sent to [facility]. We have previously given samples of the Trulicity, do you want to reconcile med list, please advise, thank you. Health Maintenance   Topic Date Due    Hepatitis C screen  Never done    HIV screen  Never done    Cervical cancer screen  Never done    Hepatitis B vaccine (1 of 3 - Risk 3-dose series) 04/07/2022 (Originally 10/7/2003)    Varicella vaccine (1 of 2 - 2-dose childhood series) 04/08/2022 (Originally 10/7/1985)    DTaP/Tdap/Td vaccine (1 - Tdap) 04/08/2022 (Originally 10/7/2003)    Flu vaccine (1) 09/01/2021    Diabetic foot exam  11/04/2021    Diabetic microalbuminuria test  04/08/2022    Lipid screen  04/08/2022    Potassium monitoring  04/08/2022    Creatinine monitoring  04/08/2022    Diabetic retinal exam  06/30/2022    A1C test (Diabetic or Prediabetic)  07/20/2022    Pneumococcal 0-64 years Vaccine (2 of 2 - PPSV23) 10/07/2049    COVID-19 Vaccine  Completed    Hepatitis A vaccine  Aged Out    Hib vaccine  Aged Out    Meningococcal (ACWY) vaccine  Aged Out             (applicable per patient's age: Cancer Screenings, Depression Screening, Fall Risk Screening, Immunizations)    Hemoglobin A1C (%)   Date Value   07/20/2021 8.5   04/08/2021 9.6 (H)   04/08/2021 10.6     Microalb/Crt.  Ratio (mcg/mg creat)   Date Value   04/08/2021 CANNOT BE CALCULATED     LDL Cholesterol (mg/dL)   Date Value   04/08/2021 82     LDL Calculated (mg/dL)   Date Value   09/26/2020 172 (H)     AST (U/L)   Date Value   04/08/2021 14     ALT (U/L)   Date Value   04/08/2021 22     BUN (mg/dL)   Date Value   04/08/2021 11      (goal A1C is < 7)   (goal LDL is <100) need 30-50% reduction from baseline     BP Readings from Last 3 Encounters:   07/20/21 130/80   07/18/21 (!) 141/77   04/08/21 112/82    (goal BP 120/80)      All Future Testing planned in [facility]:  Lab Frequency Next Occurrence   CBC With Auto Differential Once 07/20/2021   Path Review, Smear Once 07/20/2021       Next Visit Date:  Future Appointments   Date Time Provider Nandini Ojeda   11/23/2021  8:40 AM BRIDGETT Gonzalez - CNP [facility]            Patient Active Problem List:     Asthma     Diabetes mellitus ([facility] [address]     Anxiety     Moderate episode of recurrent major depressive disorder ([facility] [address]     Other hyperlipidemia     Current mild episode of major depressive disorder without prior episode ([facility] [address]     Encounter for insertion of intrauterine contraceptive device     Mild intermittent asthma without complication     Panic attacks

## 2021-11-23 ENCOUNTER — OFFICE VISIT (OUTPATIENT)
Dept: FAMILY MEDICINE CLINIC | Age: 36
End: 2021-11-23
Payer: COMMERCIAL

## 2021-11-23 ENCOUNTER — TELEPHONE (OUTPATIENT)
Dept: FAMILY MEDICINE CLINIC | Age: 36
End: 2021-11-23

## 2021-11-23 VITALS
TEMPERATURE: 98.1 F | SYSTOLIC BLOOD PRESSURE: 136 MMHG | BODY MASS INDEX: 38.8 KG/M2 | WEIGHT: 219 LBS | OXYGEN SATURATION: 98 % | DIASTOLIC BLOOD PRESSURE: 86 MMHG | HEART RATE: 112 BPM | HEIGHT: 63 IN

## 2021-11-23 DIAGNOSIS — E11.9 TYPE 2 DIABETES MELLITUS WITHOUT COMPLICATION, WITHOUT LONG-TERM CURRENT USE OF INSULIN (HCC): Primary | ICD-10-CM

## 2021-11-23 DIAGNOSIS — F41.9 ANXIETY: ICD-10-CM

## 2021-11-23 DIAGNOSIS — Z13.31 POSITIVE DEPRESSION SCREENING: ICD-10-CM

## 2021-11-23 LAB — HBA1C MFR BLD: 11.8 %

## 2021-11-23 PROCEDURE — 99214 OFFICE O/P EST MOD 30 MIN: CPT | Performed by: NURSE PRACTITIONER

## 2021-11-23 PROCEDURE — 83036 HEMOGLOBIN GLYCOSYLATED A1C: CPT | Performed by: NURSE PRACTITIONER

## 2021-11-23 PROCEDURE — 3052F HG A1C>EQUAL 8.0%<EQUAL 9.0%: CPT | Performed by: NURSE PRACTITIONER

## 2021-11-23 RX ORDER — ALOGLIPTIN 25 MG/1
25 TABLET, FILM COATED ORAL DAILY
Qty: 30 TABLET | Refills: 3 | Status: SHIPPED | OUTPATIENT
Start: 2021-11-23 | End: 2021-12-20

## 2021-11-23 RX ORDER — BUSPIRONE HYDROCHLORIDE 5 MG/1
5 TABLET ORAL 2 TIMES DAILY
Qty: 90 TABLET | Refills: 0 | Status: SHIPPED | OUTPATIENT
Start: 2021-11-23 | End: 2022-01-07

## 2021-11-23 RX ORDER — EMPAGLIFLOZIN 10 MG/1
10 TABLET, FILM COATED ORAL DAILY
Qty: 30 TABLET | Refills: 3 | Status: SHIPPED | OUTPATIENT
Start: 2021-11-23 | End: 2022-02-11

## 2021-11-23 SDOH — ECONOMIC STABILITY: FOOD INSECURITY: WITHIN THE PAST 12 MONTHS, YOU WORRIED THAT YOUR FOOD WOULD RUN OUT BEFORE YOU GOT MONEY TO BUY MORE.: NEVER TRUE

## 2021-11-23 SDOH — ECONOMIC STABILITY: FOOD INSECURITY: WITHIN THE PAST 12 MONTHS, THE FOOD YOU BOUGHT JUST DIDN'T LAST AND YOU DIDN'T HAVE MONEY TO GET MORE.: NEVER TRUE

## 2021-11-23 ASSESSMENT — SOCIAL DETERMINANTS OF HEALTH (SDOH): HOW HARD IS IT FOR YOU TO PAY FOR THE VERY BASICS LIKE FOOD, HOUSING, MEDICAL CARE, AND HEATING?: VERY HARD

## 2021-11-23 NOTE — TELEPHONE ENCOUNTER
Please notify patient I will call in two new medications: Jardiance (SGLT2-I) and alogliptan (DPP4-I). She should continue trulicity (GLP-1) as prescribed. These are approved medications listed on the insurance form she supplied during today's appointment. If she has any issues with cost she is to notify the office ASAP and not wait until her next appointment. I also encourage her to utilize the resources that were supplied on today's AVS. Call with any concerns. Please complete any PA's as they return. Thank you!

## 2021-11-23 NOTE — PATIENT INSTRUCTIONS
SURVEY:    You may be receiving a survey from [employer] regarding your visit today. Please complete the survey to enable us to provide the highest quality of care to you and your family. If you cannot score us a very good on any question, please call the office to discuss how we could of made your experience a very good one. Thank you. [address] Prescription Assistance [phone] [email or web address]   Rx [address] [phone] [email or web address]   Medicare Extra Help Prescription Assistance [phone] [facility].   Bernadette Rhodes [address] [place] Coverage [phone] [email or web address]

## 2021-11-23 NOTE — PROGRESS NOTES
Name: Luh Moody  : 1985         Chief Complaint:     Chief Complaint   Patient presents with    Follow-up     4 Months    Diabetes     She is checking her glucose readings after her mid day meal, 250, this is the biggest meal of the day.  Anxiety     States she does not believe the Lexapro is helping as much but she is not sure if this increase in anxiety is related to wedding planning. History of Present Illness:      Luh Moody is a 39 y.o.  female who presents with Follow-up (4 Months), Diabetes (She is checking her glucose readings after her mid day meal, 250, this is the biggest meal of the day.), and Anxiety (States she does not believe the Lexapro is helping as much but she is not sure if this increase in anxiety is related to wedding planning. )      HPI     DM:  Diabetic diet/low carb diet compliance: She admits overall trying to follow diabetic diet  Current exercise: Exercise at the gym three times weekly  Frequency of glucose testing at home: \"couple times a week\"  Glucometer at home: yes  Fasting glucose: averaging 250 after lunch (biggest meal)  History of hypoglycemic episodes: denies  Most recent eye examination: 2021  Diabetic foot check in the past year: 2020  Current symptoms: Denies excessive thirst or hunger. Admits increased frequency of urination within the last week. Denies numbness/tingling in hands or feet, or major changes in weight. Denies vision changes or sores that are slow to heal.    reports that she has never smoked. She has never used smokeless tobacco.   Medication compliance:  compliant all of the time  Medication Therapy: farxiga 81ES QD, trulicity 5.8SD weekly, januvia 100mg QD. She stopped taking [place] and [place] and farxiga 2.5 months ago d/t cost.   Hemoglobin A1C (%)   Date Value   2021 8.5   2021 9.6 (H)   2021 10.6   2020 10.4 (H)      Anxiety/Depression:  Current treatment includes lexapro 20mg and wellbutrin 150mg QD.  She admits increased anxiety related to her upcoming wedding. She admits worrying about \"freaking out her wedding\". She has xanax but takes this very rarely. She is concerned with taking xanax on the day of her wedding d/t inability of drinking alcohol. Past Medical History:     Past Medical History:   Diagnosis Date    Allergic rhinitis     Anxiety     Asthma     Depression     DM (diabetes mellitus) ([address]     Hyperlipidemia     Hypertension     Migraines       Reviewed all health maintenance requirements and ordered appropriate tests  Health Maintenance Due   Topic Date Due    Cervical cancer screen  Never done    COVID-19 Vaccine (2 - Booster for [employer] series) 05/11/2021    Diabetic foot exam  11/04/2021       Past Surgical History:     Past Surgical History:   Procedure Laterality Date    [facility]LECYSTECTOMY  2007    [facility]    KNEE ARTHROSCOPY  2001    right knee    TONSILLECTOMY  1988        Medications:       Prior to Admission medications    Medication Sig Start Date End Date Taking?  Authorizing Provider   busPIRone (BUSPAR) 5 MG tablet Take 1 tablet by mouth 2 times daily 11/23/21 1/7/22 Yes BRIDGETT Larsen CNP   escitalopram (LEXAPRO) 20 MG tablet TAKE 1 TABLET BY MOUTH EVERY DAY 11/1/21  Yes BRIDGETT Larsen CNP   TRULICITY 1.5 ZS/3.8EI SOPN INJECT 1.5 MG INTO THE SKIN ONCE A WEEK 8/3/21  Yes BRIDGETT Larsen CNP   buPROPion (WELLBUTRIN XL) 150 MG extended release tablet Take 1 tablet by mouth daily 4/8/21  Yes BRIDGETT Larsen CNP   atorvastatin (LIPITOR) 10 MG tablet TAKE 1 TABLET BY MOUTH EVERY DAY 1/25/21  Yes BRIDGETT Larsen CNP   glucose monitoring kit (FREESTYLE) monitoring kit 1 kit by Does not apply route daily 1/6/21  Yes BRIDGETT Larsen CNP   lisinopril (PRINIVIL;ZESTRIL) 5 MG tablet Take 5 mg by mouth daily 1/21/20  Yes Historical Provider, MD   levonorgestrel (MIRENA) IUD 52 mg 1 each by Intrauterine route once   Yes Historical Provider, MD   albuterol sulfate HFA (PROAIR HFA) 108 (90 Base) MCG/ACT inhaler Inhale 2 puffs into the lungs every 6 hours as needed for Wheezing or Shortness of Breath 8/12/20  Yes BRIDGETT Saavedra CNP   chlorhexidine (PERIDEX) 0.12 % solution SWISH 30 SECONDS WITH 15 ML AND THEN SPIT REPEAT TWICE DAILY  Patient not taking: Reported on 11/23/2021 7/13/21   Historical Provider, MD   SITagliptin (JANUVIA) 100 MG tablet Take 1 tablet by mouth daily  Patient not taking: Reported on 11/23/2021 8/3/21   BRIDGETT Saavedra CNP   dapagliflozin (FARXIGA) 10 MG tablet TAKE 1 TABLET BY MOUTH EVERY DAY IN THE MORNING  Patient not taking: Reported on 11/23/2021 8/3/21   BRIDGETT Saavedra CNP   Amoxicillin 500 MG TABS TAKE 1 TABLET BY MOUTH THREE TIMES A DAY  Patient not taking: Reported on 11/23/2021 7/13/21   Historical Provider, MD   ondansetron (ZOFRAN ODT) 4 MG disintegrating tablet Take 1 tablet by mouth every 8 hours as needed for Nausea  Patient not taking: Reported on 11/23/2021 7/18/21   Boris Bocanegra MD   Multiple Vitamins-Minerals (BODY/HAIR/SKIN/NAILS) CAPS Take by mouth  Patient not taking: Reported on 11/23/2021    Historical Provider, MD        Allergies:       Metformin, Avocado, Banana, Codeine, Cucumber extract, Food, Nut [peanut-containing drug products], Other, Seasonal, and Percocet [oxycodone-acetaminophen]    Social History:     Tobacco:    reports that she has never smoked. She has never used smokeless tobacco.  Alcohol:      reports current alcohol use. Drug Use:  reports no history of drug use. Family History:     Family History   Problem Relation Age of Onset    Stroke Father     Heart Disease Father     Diabetes Father     High Blood Pressure Father     Diabetes type 2  Brother     Heart Attack Maternal Grandfather        Review of Systems:     Positive and Negative as described in HPI    Review of Systems   Constitutional: Negative for unexpected weight change.    Eyes: Negative for visual disturbance. Endocrine: Positive for polyuria. Negative for polydipsia and polyphagia. Physical Exam:   Vitals:  /86 (Site: Left Upper Arm, Position: Sitting, Cuff Size: Large Adult)   Pulse 112   Temp 98.1 °F (36.7 °C)   Ht 5' 3\" (1.6 m)   Wt 219 lb (99.3 kg)   SpO2 98%   BMI 38.79 kg/m²     Physical Exam  Constitutional:       General: She is not in acute distress. Appearance: Normal appearance. She is obese. She is not ill-appearing or toxic-appearing. Cardiovascular:      Rate and Rhythm: Regular rhythm. Tachycardia present. Heart sounds: Normal heart sounds. No murmur heard. Pulmonary:      Effort: Pulmonary effort is normal. No respiratory distress. Breath sounds: Normal breath sounds. No stridor. No wheezing, rhonchi or rales. Neurological:      Mental Status: She is alert. Psychiatric:         Behavior: Behavior normal.         Thought Content: Thought content normal.         Judgment: Judgment normal.      Comments: Anxious, tearful         Data:     Lab Results   Component Value Date     04/08/2021    K 4.1 04/08/2021     04/08/2021    CO2 25 04/08/2021    BUN 11 04/08/2021    CREATININE 0.56 04/08/2021    GLUCOSE 212 07/18/2021    GLUCOSE 299 09/26/2020    PROT 7.1 04/08/2021    LABALBU 3.9 04/08/2021    BILITOT 0.46 04/08/2021    ALKPHOS 63 04/08/2021    AST 14 04/08/2021    ALT 22 04/08/2021     Lab Results   Component Value Date    WBC 13.8 04/08/2021    RBC 4.76 04/08/2021    RBC 4.97 09/26/2020    HGB 14.2 04/08/2021    HCT 43.0 04/08/2021    MCV 90.3 04/08/2021    MCH 29.8 04/08/2021    MCHC 33.0 04/08/2021    RDW 12.9 04/08/2021     04/08/2021    MPV 9.7 04/08/2021     No results found for: TSH  Lab Results   Component Value Date    CHOL 144 04/08/2021    HDL 40 04/08/2021    LABA1C 8.5 07/20/2021       Assessment/Plan:      Diagnosis Orders   1.  Type 2 diabetes mellitus without complication, without long-term current use of insulin (HCC)  POCT glycosylated hemoglobin (Hb A1C)   2. Anxiety     3. Positive depression screening         Anxiety/Depression:   Continue Lexapro 20 mg daily  Add BuSpar 5 mg twice daily  Continue Xanax as needed  Encourage patient to notify office of any concerns. She will call to update the office if she believes a dose increase in BuSpar is needed. DM:   POC A1c today in office 11.8.    Continue Trulicity 1.5 mg once weekly  She selfdiscontinued Farxiga and Januvia due to cost 2.5 months ago. She has a list of covered medications by her insurance company and will send them to our office for review today. I will assess this list and prescribe additional medications as appropriate. Contact information for medical and prescription assistance was supplied to patient today  Counseled on diabetic diet and routine exercise  Follow-up in office 6 weeks    Completed Refills   Requested Prescriptions     Signed Prescriptions Disp Refills    busPIRone (BUSPAR) 5 MG tablet 90 tablet 0     Sig: Take 1 tablet by mouth 2 times daily       Orders Placed This Encounter   Procedures    POCT glycosylated hemoglobin (Hb A1C)        No results found for this visit on 11/23/21. Return if symptoms worsen or fail to improve, for DM and anxiety f/u .     Electronically signed by BRIDGETT Samaniego CNP on 11/23/21 at 10:28 AM.

## 2022-01-31 RX ORDER — ATORVASTATIN CALCIUM 10 MG/1
TABLET, FILM COATED ORAL
Qty: 90 TABLET | Refills: 3 | Status: SHIPPED | OUTPATIENT
Start: 2022-01-31

## 2022-02-11 ENCOUNTER — OFFICE VISIT (OUTPATIENT)
Dept: FAMILY MEDICINE CLINIC | Age: 37
End: 2022-02-11
Payer: COMMERCIAL

## 2022-02-11 VITALS
OXYGEN SATURATION: 100 % | SYSTOLIC BLOOD PRESSURE: 110 MMHG | WEIGHT: 240 LBS | HEIGHT: 63 IN | BODY MASS INDEX: 42.52 KG/M2 | DIASTOLIC BLOOD PRESSURE: 82 MMHG | RESPIRATION RATE: 14 BRPM | HEART RATE: 110 BPM

## 2022-02-11 DIAGNOSIS — E11.9 TYPE 2 DIABETES MELLITUS WITHOUT COMPLICATION, WITHOUT LONG-TERM CURRENT USE OF INSULIN (HCC): Primary | ICD-10-CM

## 2022-02-11 PROCEDURE — 99214 OFFICE O/P EST MOD 30 MIN: CPT | Performed by: NURSE PRACTITIONER

## 2022-02-11 RX ORDER — METFORMIN HYDROCHLORIDE 500 MG/1
500 TABLET, EXTENDED RELEASE ORAL
Qty: 60 TABLET | Refills: 3 | Status: SHIPPED | OUTPATIENT
Start: 2022-02-11 | End: 2022-07-26

## 2022-02-11 RX ORDER — PIOGLITAZONEHYDROCHLORIDE 15 MG/1
15 TABLET ORAL DAILY
Qty: 30 TABLET | Refills: 3 | Status: SHIPPED | OUTPATIENT
Start: 2022-02-11 | End: 2022-03-07

## 2022-02-11 ASSESSMENT — PATIENT HEALTH QUESTIONNAIRE - PHQ9
SUM OF ALL RESPONSES TO PHQ QUESTIONS 1-9: 0
8. MOVING OR SPEAKING SO SLOWLY THAT OTHER PEOPLE COULD HAVE NOTICED. OR THE OPPOSITE, BEING SO FIGETY OR RESTLESS THAT YOU HAVE BEEN MOVING AROUND A LOT MORE THAN USUAL: 0
SUM OF ALL RESPONSES TO PHQ9 QUESTIONS 1 & 2: 0
7. TROUBLE CONCENTRATING ON THINGS, SUCH AS READING THE NEWSPAPER OR WATCHING TELEVISION: 0
SUM OF ALL RESPONSES TO PHQ QUESTIONS 1-9: 0
1. LITTLE INTEREST OR PLEASURE IN DOING THINGS: 0
3. TROUBLE FALLING OR STAYING ASLEEP: 0
SUM OF ALL RESPONSES TO PHQ QUESTIONS 1-9: 0
2. FEELING DOWN, DEPRESSED OR HOPELESS: 0
4. FEELING TIRED OR HAVING LITTLE ENERGY: 0
9. THOUGHTS THAT YOU WOULD BE BETTER OFF DEAD, OR OF HURTING YOURSELF: 0
6. FEELING BAD ABOUT YOURSELF - OR THAT YOU ARE A FAILURE OR HAVE LET YOURSELF OR YOUR FAMILY DOWN: 0
SUM OF ALL RESPONSES TO PHQ QUESTIONS 1-9: 0
5. POOR APPETITE OR OVEREATING: 0
10. IF YOU CHECKED OFF ANY PROBLEMS, HOW DIFFICULT HAVE THESE PROBLEMS MADE IT FOR YOU TO DO YOUR WORK, TAKE CARE OF THINGS AT HOME, OR GET ALONG WITH OTHER PEOPLE: 0

## 2022-02-11 NOTE — PROGRESS NOTES
Name: James Rosario  : 1985         Chief Complaint:     Chief Complaint   Patient presents with    Diabetes     patient comes in for jardiance/trulicity . patient is not able to afford. History of Present Illness:      Amanda Real is a 39 y.o.  female who presents with Diabetes (patient comes in for jardiance/trulicity . patient is not able to afford. )      HPI    The patient presents to discuss diabetes medications. She is currently taking alogliptan 25mg QD. She was previously prescribed jardiance 68ZP and trulicity 1.9YQ once weekly but discontinued these two medications. She discontinued jardiance 2 weeks ago d/t cost. She discontinued trulicity 1 month ago d/t cost.    Past Medical History:     Past Medical History:   Diagnosis Date    Allergic rhinitis     Anxiety     Asthma     Depression     DM [facility]di[facility] mellitus) [facility]     Hyperlipidemia     Hypertension     Migraines       Reviewed all health maintenance requirements and ordered appropriate tests  Health Maintenance Due   Topic Date Due    Cervical cancer screen  Never done    COVID-19 Vaccine (2 - Booster for [place] series) 2021    Diabetic foot exam  2021    A1C test (Diabetic or Prediabetic)  2022       Past Surgical History:     Past Surgical History:   Procedure Laterality Date    [facility]      [facility]    KNEE ARTHROSCOPY      right knee    TONSILLECTOMY          Medications:       Prior to Admission medications    Medication Sig Start Date End Date Taking?  Authorizing Provider   metFORMIN (GLUCOPHAGE-XR) 500 MG extended release tablet Take 1 tablet by mouth 2 times daily (before meals) 22  Yes Mitcheal Pain, APRN - CNP   pioglitazone (ACTOS) 15 MG tablet Take 1 tablet by mouth daily 22  Yes Mitcheal Pain, APRN - CNP   atorvastatin (LIPITOR) 10 MG tablet TAKE 1 TABLET BY MOUTH EVERY DAY 22  Yes Mitcheal Pain, APRN - CNP   alogliptin (NESINA) 25 MG TABS tablet TAKE 1 TABLET BY MOUTH EVERY DAY 12/20/21  Yes BRIDGETT Hilton CNP   escitalopram (LEXAPRO) 20 MG tablet TAKE 1 TABLET BY MOUTH EVERY DAY 11/1/21  Yes BRIDGETT Hilton CNP   buPROPion (WELLBUTRIN XL) 150 MG extended release tablet Take 1 tablet by mouth daily 4/8/21  Yes BRIDGETT Hilton CNP   glucose monitoring kit (FREESTYLE) monitoring kit 1 kit by Does not apply route daily 1/6/21  Yes BRIDGETT Hilton CNP   lisinopril (PRINIVIL;ZESTRIL) 5 MG tablet Take 5 mg by mouth daily 1/21/20  Yes Historical Provider, MD   levonorgestrel (MIRENA) IUD 52 mg 1 each by Intrauterine route once   Yes Historical Provider, MD   albuterol sulfate HFA (PROAIR HFA) 108 (90 Base) MCG/ACT inhaler Inhale 2 puffs into the lungs every 6 hours as needed for Wheezing or Shortness of Breath 8/12/20  Yes BRIDGETT Hilton CNP   Multiple Vitamins-Minerals (BODY/HAIR/SKIN/NAILS) CAPS Take by mouth  Patient not taking: Reported on 11/23/2021    Historical Provider, MD        Allergies:       Metformin, Avocado, Banana, Codeine, Cucumber extract, Food, Nut [peanut-containing drug products], Other, Seasonal, and Percocet [oxycodone-acetaminophen]    Social History:     Tobacco:    reports that she has never smoked. She has never used smokeless tobacco.  Alcohol:      reports current alcohol use. Drug Use:  reports no history of drug use. Family History:     Family History   Problem Relation Age of Onset    Stroke Father     Heart Disease Father     Diabetes Father     High Blood Pressure Father     Diabetes type 2  Brother     Heart Attack Maternal Grandfather        Review of Systems:     Positive and Negative as described in HPI    Review of Systems    Physical Exam:   Vitals:  /82   Pulse 110   Resp 14   Ht 5' 3\" (1.6 m)   Wt 240 lb (108.9 kg)   SpO2 100%   BMI 42.51 kg/m²     Physical Exam  Constitutional:       General: She is not in acute distress.      Appearance: Normal appearance. She is obese. She is not ill-appearing or toxic-appearing. Neurological:      Mental Status: She is alert and oriented to person, place, and time. Psychiatric:         Mood and Affect: Mood normal.         Behavior: Behavior normal.         Thought Content: Thought content normal.         Judgment: Judgment normal.         Data:     Lab Results   Component Value Date     04/08/2021    K 4.1 04/08/2021     04/08/2021    CO2 25 04/08/2021    BUN 11 04/08/2021    CREATININE 0.56 04/08/2021    GLUCOSE 212 07/18/2021    GLUCOSE 299 09/26/2020    PROT 7.1 04/08/2021    LABALBU 3.9 04/08/2021    BILITOT 0.46 04/08/2021    ALKPHOS 63 04/08/2021    AST 14 04/08/2021    ALT 22 04/08/2021     Lab Results   Component Value Date    WBC 13.8 04/08/2021    RBC 4.76 04/08/2021    RBC 4.97 09/26/2020    HGB 14.2 04/08/2021    HCT 43.0 04/08/2021    MCV 90.3 04/08/2021    MCH 29.8 04/08/2021    MCHC 33.0 04/08/2021    RDW 12.9 04/08/2021     04/08/2021    MPV 9.7 04/08/2021     No results found for: TSH  Lab Results   Component Value Date    CHOL 144 04/08/2021    HDL 40 04/08/2021    LABA1C 11.8 11/23/2021       Assessment/Plan:      Diagnosis Orders   1. Type 2 diabetes mellitus without complication, without long-term current use of insulin ([address]         -Discontinue Jardiance and Trulicity due to cost  -Patient has trialed Wynelle Dami in the past but discontinued also due to cost  -Office staff checked with the [employer] program to assist patient with affording Trulicity, though her insurance still states that Trulicity would be $088 per month and Jardiance $410 per month.  -Continue alogliptin 25 mg as this is covered well under insurance at this time  -Initiate pioglitazone 15 mg daily  -Patient previously trialed metformin immediate release when she was 20years old and admits history of hives at this time.   However, she is not certain that hives were related to the Metformin as she was having several other medical concerns at that time. She confirms she did not have any anaphylaxis symptoms including difficulty breathing, or throat/tongue/lip tingling/swelling. She would like to retry Metformin. Will start her on Metformin extended release 500 mg twice daily. Reviewed side effects including diarrhea. We reviewed at length signs and symptoms of allergic reaction including anaphylaxis such as hives, facial swelling, difficulty breathing, and tongue/lip/throat tightening/swelling/tingling. She was instructed to present to the ED or call 911 if these occur.  -Counseled on importance of continued diabetic diet and exercise  -We will see patient back in 2 months with POC A1c or sooner if concerns arise.  -We will plan to maximize Metformin and pioglitazone doses  -If A1c is still not under goal, will consider adding basal insulin  -She will call in 2 weeks with update in her symptoms and to check in on how she is tolerating medication    Completed Refills   Requested Prescriptions     Signed Prescriptions Disp Refills    metFORMIN (GLUCOPHAGE-XR) 500 MG extended release tablet 60 tablet 3     Sig: Take 1 tablet by mouth 2 times daily (before meals)    pioglitazone (ACTOS) 15 MG tablet 30 tablet 3     Sig: Take 1 tablet by mouth daily       No orders of the defined types were placed in this encounter. No results found for this visit on 02/11/22. Return in about 2 months (around 4/11/2022), or if symptoms worsen or fail to improve, for DM f/u with POC A1c.     Electronically signed by BRIDGETT Hernandez CNP on 02/11/22 at 2:30 PM.

## 2022-02-11 NOTE — PATIENT INSTRUCTIONS
Call with metformin update 2/21/22     SURVEY:    You may be receiving a survey from [employer] regarding your visit today. Please complete the survey to enable us to provide the highest quality of care to you and your family. If you cannot score us a very good on any question, please call the office to discuss how we could have made your experience a very good one. Thank you.

## 2022-02-21 ENCOUNTER — TELEPHONE (OUTPATIENT)
Dept: FAMILY MEDICINE CLINIC | Age: 37
End: 2022-02-21

## 2022-02-22 RX ORDER — GLIMEPIRIDE 2 MG/1
2 TABLET ORAL
Qty: 30 TABLET | Refills: 0 | Status: SHIPPED | OUTPATIENT
Start: 2022-02-22 | End: 2022-03-16

## 2022-02-22 NOTE — TELEPHONE ENCOUNTER
If she is having hives with metformin, that means that this medication is not appropriate for her. She is to D/C metformin d/t allergic reaction. Continue alogliptan and pioglitazone.  Initiate glimepiride 2mg QD with breakfast.     TFM - please pend glimepiride rx

## 2022-03-07 RX ORDER — METFORMIN HYDROCHLORIDE 500 MG/1
TABLET, EXTENDED RELEASE ORAL
Qty: 60 TABLET | Refills: 3 | OUTPATIENT
Start: 2022-03-07

## 2022-03-07 RX ORDER — PIOGLITAZONEHYDROCHLORIDE 15 MG/1
TABLET ORAL
Qty: 30 TABLET | Refills: 3 | Status: SHIPPED | OUTPATIENT
Start: 2022-03-07 | End: 2022-04-18 | Stop reason: ALTCHOICE

## 2022-03-16 RX ORDER — GLIMEPIRIDE 2 MG/1
TABLET ORAL
Qty: 30 TABLET | Refills: 5 | Status: SHIPPED | OUTPATIENT
Start: 2022-03-16 | End: 2022-04-18 | Stop reason: ALTCHOICE

## 2022-03-25 DIAGNOSIS — F33.1 MODERATE EPISODE OF RECURRENT MAJOR DEPRESSIVE DISORDER (HCC): ICD-10-CM

## 2022-03-25 RX ORDER — BUPROPION HYDROCHLORIDE 150 MG/1
TABLET ORAL
Qty: 90 TABLET | Refills: 1 | Status: SHIPPED | OUTPATIENT
Start: 2022-03-25 | End: 2022-10-07

## 2022-04-18 ENCOUNTER — OFFICE VISIT (OUTPATIENT)
Dept: FAMILY MEDICINE CLINIC | Age: 37
End: 2022-04-18
Payer: COMMERCIAL

## 2022-04-18 VITALS
HEART RATE: 103 BPM | OXYGEN SATURATION: 100 % | BODY MASS INDEX: 38.45 KG/M2 | RESPIRATION RATE: 14 BRPM | SYSTOLIC BLOOD PRESSURE: 122 MMHG | DIASTOLIC BLOOD PRESSURE: 76 MMHG | HEIGHT: 63 IN | WEIGHT: 217 LBS

## 2022-04-18 DIAGNOSIS — E11.9 TYPE 2 DIABETES MELLITUS WITHOUT COMPLICATION, WITHOUT LONG-TERM CURRENT USE OF INSULIN (HCC): Primary | ICD-10-CM

## 2022-04-18 LAB — HBA1C MFR BLD: 13.4 %

## 2022-04-18 PROCEDURE — 3046F HEMOGLOBIN A1C LEVEL >9.0%: CPT | Performed by: NURSE PRACTITIONER

## 2022-04-18 PROCEDURE — 83036 HEMOGLOBIN GLYCOSYLATED A1C: CPT | Performed by: NURSE PRACTITIONER

## 2022-04-18 PROCEDURE — 99214 OFFICE O/P EST MOD 30 MIN: CPT | Performed by: NURSE PRACTITIONER

## 2022-04-18 RX ORDER — GLIMEPIRIDE 2 MG/1
2 TABLET ORAL 2 TIMES DAILY WITH MEALS
Qty: 60 TABLET | Refills: 5 | Status: SHIPPED | OUTPATIENT
Start: 2022-04-18 | End: 2022-07-07

## 2022-04-18 RX ORDER — GLUCOSAMINE HCL/CHONDROITIN SU 500-400 MG
CAPSULE ORAL
Qty: 100 STRIP | Refills: 3 | Status: SHIPPED | OUTPATIENT
Start: 2022-04-18

## 2022-04-18 RX ORDER — INSULIN DETEMIR 100 [IU]/ML
10 INJECTION, SOLUTION SUBCUTANEOUS NIGHTLY
Qty: 5 PEN | Refills: 3 | Status: SHIPPED | OUTPATIENT
Start: 2022-04-18

## 2022-04-18 RX ORDER — PIOGLITAZONEHYDROCHLORIDE 30 MG/1
30 TABLET ORAL DAILY
Qty: 30 TABLET | Refills: 3 | Status: SHIPPED | OUTPATIENT
Start: 2022-04-18 | End: 2022-05-16

## 2022-04-18 NOTE — PATIENT INSTRUCTIONS
Discontinue alogliptan  Increase pioglitazone from 15mg once daily to 30mg once daily   Increase glimepiride 2mg from once daily to twice daily   START long acting insulin 10 UNITS once nightly     SURVEY:    You may be receiving a survey from [employer] regarding your visit today. Please complete the survey to enable us to provide the highest quality of care to you and your family. If you cannot score us a very good on any question, please call the office to discuss how we could of made your experience a very good one. Thank you.

## 2022-04-18 NOTE — PROGRESS NOTES
Name: Bryant Serrano  : 1985         Chief Complaint:     Chief Complaint   Patient presents with    Diabetes     2 month dm check. A1c ran in office. 8.5 in office .  Pain     patient thinks she strained a muscle in her hip while at the gym. denies any other concerns. History of Present Illness:      Amanda Real is a 39 y.o.  female who presents with Diabetes (2 month dm check. A1c ran in office. 8.5 in office . ) and Pain (patient thinks she strained a muscle in her hip while at the gym. denies any other concerns. )      HPI     DM:  Diabetic diet/low carb diet compliance: Admits following diabetic diet  Current exercise: gym 3-4 times per week   Frequency of glucose testing at home: fasting in the morning average 150  Glucometer at home: yes  History of hypoglycemic episodes: Admits 2-3 episodes in the last 2 months  Last eye exam: 2021  Last diabetic foot check: Today 22   reports that she has never smoked. She has never used smokeless tobacco.   Medication compliance: complaint   Medication Therapy: alogliptan 25mg QD, pioglitazone 15mg QD, glimepiride 2mg QD. She ran out of alopgliptan today and refill is >500 dollars.    Hemoglobin A1C (%)   Date Value   2022 13.4   2021 11.8   2021 8.5   2021 9.6 (H)   2021 10.6        Past Medical History:     Past Medical History:   Diagnosis Date    Allergic rhinitis     Anxiety     Asthma     Depression     DM (diabetes mellitus) ([facility] [address]     Hyperlipidemia     Hypertension     Migraines       Reviewed all health maintenance requirements and ordered appropriate tests  Health Maintenance Due   Topic Date Due    Varicella vaccine (1 of 2 - 2-dose childhood series) Never done    Hepatitis B vaccine (1 of 3 - Risk 3-dose series) Never done    DTaP/Tdap/Td vaccine (1 - Tdap) Never done    Cervical cancer screen  Never done    COVID-19 Vaccine (2 - Booster for Reginaldo series) 2021    Pneumococcal 0-64 years Vaccine (2 - PCV) 09/28/2021    Potassium monitoring  04/08/2022    Creatinine monitoring  04/08/2022    Diabetic microalbuminuria test  04/08/2022    Lipid screen  04/08/2022       Past Surgical History:     Past Surgical History:   Procedure Laterality Date    [facility]    KNEE ARTHROSCOPY  2001    right knee    TONSILLECTOMY  1988        Medications:       Prior to Admission medications    Medication Sig Start Date End Date Taking? Authorizing Provider   pioglitazone (ACTOS) 30 MG tablet Take 1 tablet by mouth daily 4/18/22  Yes BRIDGETT Shabazz CNP   glimepiride (AMARYL) 2 MG tablet Take 1 tablet by mouth 2 times daily (with meals) 4/18/22  Yes BRIDGETT Shabazz CNP   insulin detemir (LEVEMIR FLEXTOUCH) 100 UNIT/ML injection pen Inject 10 Units into the skin nightly 4/18/22  Yes BRIDGETT Shabazz CNP   blood glucose monitor strips Test 3 times a day & as needed for symptoms of irregular blood glucose. Dispense sufficient amount for indicated testing frequency plus additional to accommodate PRN testing needs.  4/18/22  Yes BRIDGETT Shabazz CNP   buPROPion (WELLBUTRIN XL) 150 MG extended release tablet TAKE 1 TABLET BY MOUTH EVERY DAY 3/25/22  Yes BRIDGETT Shabazz CNP   metFORMIN (GLUCOPHAGE-XR) 500 MG extended release tablet Take 1 tablet by mouth 2 times daily (before meals) 2/11/22  Yes BRIDGETT Shabazz CNP   atorvastatin (LIPITOR) 10 MG tablet TAKE 1 TABLET BY MOUTH EVERY DAY 1/31/22  Yes BRIDGETT Shabazz CNP   escitalopram (LEXAPRO) 20 MG tablet TAKE 1 TABLET BY MOUTH EVERY DAY 11/1/21  Yes BRIDGETT Shabazz CNP   glucose monitoring kit (FREESTYLE) monitoring kit 1 kit by Does not apply route daily 1/6/21  Yes BRIDGETT Shabazz CNP   lisinopril (PRINIVIL;ZESTRIL) 5 MG tablet Take 5 mg by mouth daily 1/21/20  Yes Historical Provider, MD   levonorgestrel (MIRENA) IUD 52 mg 1 each by Intrauterine route once Yes Historical Provider, MD   albuterol sulfate HFA (PROAIR HFA) 108 (90 Base) MCG/ACT inhaler Inhale 2 puffs into the lungs every 6 hours as needed for Wheezing or Shortness of Breath 8/12/20  Yes BRIDGETT Osman CNP   Multiple Vitamins-Minerals (BODY/HAIR/SKIN/NAILS) CAPS Take by mouth  Patient not taking: Reported on 11/23/2021    Historical Provider, MD        Allergies:       Metformin, Avocado, Banana, Codeine, Cucumber extract, Food, Nut [peanut-containing drug products], Other, Seasonal, and Percocet [oxycodone-acetaminophen]    Social History:     Tobacco:    reports that she has never smoked. She has never used smokeless tobacco.  Alcohol:      reports current alcohol use. Drug Use:  reports no history of drug use. Family History:     Family History   Problem Relation Age of Onset    Stroke Father     Heart Disease Father     Diabetes Father     High Blood Pressure Father     Diabetes type 2  Brother     Heart Attack Maternal Grandfather        Review of Systems:     Positive and Negative as described in HPI    Review of Systems    Physical Exam:   Vitals:  /76   Pulse 103   Resp 14   Ht 5' 3\" (1.6 m)   Wt 217 lb (98.4 kg)   SpO2 100%   BMI 38.44 kg/m²     Physical Exam  Constitutional:       General: She is not in acute distress. Appearance: Normal appearance. She is obese. She is not ill-appearing or toxic-appearing. Cardiovascular:      Rate and Rhythm: Normal rate and regular rhythm. Pulses:           Dorsalis pedis pulses are 2+ on the right side and 2+ on the left side. Posterior tibial pulses are 2+ on the left side. Heart sounds: Normal heart sounds. No murmur heard. Pulmonary:      Effort: Pulmonary effort is normal. No respiratory distress. Breath sounds: Normal breath sounds. No stridor. No wheezing, rhonchi or rales. Feet:      Right foot:      Protective Sensation: 6 sites tested. 6 sites sensed.       Skin integrity: Dry skin present. No erythema, warmth, callus or fissure. Toenail Condition: Right toenails are normal.      Left foot:      Protective Sensation: 6 sites tested. 6 sites sensed. Skin integrity: Dry skin present. No erythema, warmth, callus or fissure. Toenail Condition: Left toenails are normal.   Neurological:      Mental Status: She is alert and oriented to person, place, and time. Psychiatric:         Mood and Affect: Mood normal.         Behavior: Behavior normal.         Thought Content: Thought content normal.         Judgment: Judgment normal.         Data:     Lab Results   Component Value Date     04/08/2021    K 4.1 04/08/2021     04/08/2021    CO2 25 04/08/2021    BUN 11 04/08/2021    CREATININE 0.56 04/08/2021    GLUCOSE 212 07/18/2021    GLUCOSE 299 09/26/2020    PROT 7.1 04/08/2021    LABALBU 3.9 04/08/2021    BILITOT 0.46 04/08/2021    ALKPHOS 63 04/08/2021    AST 14 04/08/2021    ALT 22 04/08/2021     Lab Results   Component Value Date    WBC 13.8 04/08/2021    RBC 4.76 04/08/2021    RBC 4.97 09/26/2020    HGB 14.2 04/08/2021    HCT 43.0 04/08/2021    MCV 90.3 04/08/2021    MCH 29.8 04/08/2021    MCHC 33.0 04/08/2021    RDW 12.9 04/08/2021     04/08/2021    MPV 9.7 04/08/2021     No results found for: TSH  Lab Results   Component Value Date    CHOL 144 04/08/2021    HDL 40 04/08/2021    LABA1C 13.4 04/18/2022       Assessment/Plan:      Diagnosis Orders   1.  Type 2 diabetes mellitus without complication, without long-term current use of insulin ([facility])  POCT glycosylated hemoglobin (Hb A1C)    [facility] Diabetes Education [place]     DIABETES FOOT EXAM    blood glucose monitor strips     POC A1c today in office 13.4%  Sukhedv Carter, Mukesh Hannah, Patrice Humphries, and Januvia in the past but discontinued due to cost  D/C alogliptin 25 mg QD today d/t cost  Allergy to metformin (hives)   Increase pioglitazone from 15 mg to 30 mg daily   Increase glimepiride from 2 mg daily to 2 mg twice daily  Initiate insulin detemir 10 units nightly  Referral to diabetes education placed today, patient agreeable. Counseled on importance of continued diabetic diet and exercise  Given glucose monitoring sheet. She will call office once weekly with updates so that adjustments in her medications can be made. Will plan to maximize pioglitazone and titrate insulin upwards as tolerated. Counseled on hypoglycemia and its presentation  Foot exam performed today, WNL  Follow-up in office 4 weeks or sooner with any concerns    Completed Refills   Requested Prescriptions     Signed Prescriptions Disp Refills    pioglitazone (ACTOS) 30 MG tablet 30 tablet 3     Sig: Take 1 tablet by mouth daily    glimepiride (AMARYL) 2 MG tablet 60 tablet 5     Sig: Take 1 tablet by mouth 2 times daily (with meals)    insulin detemir (LEVEMIR FLEXTOUCH) 100 UNIT/ML injection pen 5 pen 3     Sig: Inject 10 Units into the skin nightly    blood glucose monitor strips 100 strip 3     Sig: Test 3 times a day & as needed for symptoms of irregular blood glucose. Dispense sufficient amount for indicated testing frequency plus additional to accommodate PRN testing needs. Orders Placed This Encounter   Procedures   [facility] Diabetes Education [place]     Referral Priority:   Routine     Referral Type:   Eval and Treat     Referral Reason:   Specialty Services Required     Number of Visits Requested:   1    POCT glycosylated hemoglobin (Hb A1C)     DIABETES FOOT EXAM        Results for POC orders placed in visit on 04/18/22   POCT glycosylated hemoglobin (Hb A1C)   Result Value Ref Range    Hemoglobin A1C 13.4 %       Return in about 4 weeks (around 5/16/2022), or if symptoms worsen or fail to improve, for DM follow up .     Electronically signed by BRIDGETT Salazar CNP on 04/18/22 at 9:36 AM.

## 2022-04-25 DIAGNOSIS — E11.9 TYPE 2 DIABETES MELLITUS WITHOUT COMPLICATION, WITHOUT LONG-TERM CURRENT USE OF INSULIN (HCC): Primary | ICD-10-CM

## 2022-04-25 RX ORDER — PEN NEEDLE, DIABETIC 31 GX5/16"
1 NEEDLE, DISPOSABLE MISCELLANEOUS DAILY
Qty: 100 EACH | Refills: 3 | Status: SHIPPED | OUTPATIENT
Start: 2022-04-25

## 2022-05-16 ENCOUNTER — OFFICE VISIT (OUTPATIENT)
Dept: FAMILY MEDICINE CLINIC | Age: 37
End: 2022-05-16
Payer: COMMERCIAL

## 2022-05-16 VITALS
SYSTOLIC BLOOD PRESSURE: 120 MMHG | HEART RATE: 102 BPM | RESPIRATION RATE: 14 BRPM | HEIGHT: 63 IN | BODY MASS INDEX: 39.34 KG/M2 | WEIGHT: 222 LBS | DIASTOLIC BLOOD PRESSURE: 78 MMHG | OXYGEN SATURATION: 100 %

## 2022-05-16 DIAGNOSIS — E11.9 TYPE 2 DIABETES MELLITUS WITHOUT COMPLICATION, WITHOUT LONG-TERM CURRENT USE OF INSULIN (HCC): Primary | ICD-10-CM

## 2022-05-16 PROCEDURE — 3046F HEMOGLOBIN A1C LEVEL >9.0%: CPT | Performed by: NURSE PRACTITIONER

## 2022-05-16 PROCEDURE — 99214 OFFICE O/P EST MOD 30 MIN: CPT | Performed by: NURSE PRACTITIONER

## 2022-05-16 RX ORDER — PIOGLITAZONEHYDROCHLORIDE 30 MG/1
TABLET ORAL
Qty: 30 TABLET | Refills: 3 | Status: SHIPPED | OUTPATIENT
Start: 2022-05-16 | End: 2022-10-19 | Stop reason: SDUPTHER

## 2022-05-16 NOTE — PROGRESS NOTES
Name: Earlyne Fleischer  : 1985         Chief Complaint:     Chief Complaint   Patient presents with    Diabetes     4 week dm check. tracking numbers at home. started out low with the insulin. hasnt gone over 120. states she feels amazing. History of Present Illness:      Amanda Real is a 39 y.o.  female who presents with Diabetes (4 week dm check. tracking numbers at home. started out low with the insulin. hasnt gone over 120. states she feels amazing. )      HPI     DM: Today, she states \"she feels really good\". She admits increased diet. Diabetic diet/low carb diet compliance: Admits decreased sugar in diet. Current exercise: going to the gym  Frequency of exercise: 3-4 times per week  Frequency of glucose testing at home: she is out of glucose testing strips. When she was monitoring her glucose TID, fasting in the morning average 100  Glucometer at home: out of glucometer strips  History of hypoglycemic episodes: symptoms include increased hunger and \"feeling whoozy\". These occur once a week, usually around lunch time. She usually skips breakfast on these days   reports that she has never smoked. She has never used smokeless tobacco.   Medication compliance:  compliant all of the time  Medication Therapy: Pioglitazone 30 mg daily, glimepiride 2 mg twice daily, insulin detemir 10 units nightly.    Hemoglobin A1C (%)   Date Value   2022 13.4   2021 11.8   2021 8.5   2021 9.6 (H)   2021 10.6        Past Medical History:     Past Medical History:   Diagnosis Date    Allergic rhinitis     Anxiety     Asthma     Depression     DM [facility]di[facility]) ([facility] [address]     Hyperlipidemia     Hypertension     Migraines       Reviewed all health maintenance requirements and ordered appropriate tests  Health Maintenance Due   Topic Date Due    Hepatitis B vaccine (1 of 3 - Risk 3-dose series) Never done    DTaP/Tdap/Td vaccine (1 - Tdap) Never done    Cervical cancer screen Never done    COVID-19 Vaccine (2 - Booster for Reginaldo series) 05/11/2021    Pneumococcal 0-64 years Vaccine (2 - PCV) 09/28/2021    Diabetic microalbuminuria test  04/08/2022    Lipids  04/08/2022       Past Surgical History:     Past Surgical History:   Procedure Laterality Date    [facility]    KNEE ARTHROSCOPY  2001    right knee    TONSILLECTOMY  1988        Medications:       Prior to Admission medications    Medication Sig Start Date End Date Taking? Authorizing Provider   Insulin Pen Needle ([employer] PEN NEEDLES 31G) 31G X 8 MM MISC 1 each by Does not apply route daily 4/25/22  Yes BRIDGETT Blankenship CNP   pioglitazone (ACTOS) 30 MG tablet Take 1 tablet by mouth daily 4/18/22  Yes BRIDGETT Blankenship CNP   glimepiride (AMARYL) 2 MG tablet Take 1 tablet by mouth 2 times daily (with meals) 4/18/22  Yes BRIDGETT Blankenship CNP   insulin detemir (LEVEMIR FLEXTOUCH) 100 UNIT/ML injection pen Inject 10 Units into the skin nightly 4/18/22  Yes BRIDGETT Blankenship CNP   blood glucose monitor strips Test 3 times a day & as needed for symptoms of irregular blood glucose. Dispense sufficient amount for indicated testing frequency plus additional to accommodate PRN testing needs.  4/18/22  Yes BRIDGETT Blankenship CNP   buPROPion (WELLBUTRIN XL) 150 MG extended release tablet TAKE 1 TABLET BY MOUTH EVERY DAY 3/25/22  Yes BRIDGETT Blankenship CNP   atorvastatin (LIPITOR) 10 MG tablet TAKE 1 TABLET BY MOUTH EVERY DAY 1/31/22  Yes BRIDGETT Blankenship CNP   escitalopram (LEXAPRO) 20 MG tablet TAKE 1 TABLET BY MOUTH EVERY DAY 11/1/21  Yes BRIDGETT Blankenship CNP   glucose monitoring kit (FREESTYLE) monitoring kit 1 kit by Does not apply route daily 1/6/21  Yes BRIDGETT Blankenship CNP   lisinopril (PRINIVIL;ZESTRIL) 5 MG tablet Take 5 mg by mouth daily 1/21/20  Yes Historical Provider, MD   levonorgestrel (MIRENA) IUD 52 mg 1 each by Intrauterine route once Yes Historical Provider, MD   albuterol sulfate HFA (PROAIR HFA) 108 (90 Base) MCG/ACT inhaler Inhale 2 puffs into the lungs every 6 hours as needed for Wheezing or Shortness of Breath 8/12/20  Yes BRIDGETT Carvalho CNP   metFORMIN (GLUCOPHAGE-XR) 500 MG extended release tablet Take 1 tablet by mouth 2 times daily (before meals)  Patient not taking: Reported on 5/16/2022 2/11/22   BRIDGETT Carvalho CNP   Multiple Vitamins-Minerals (BODY/HAIR/SKIN/NAILS) CAPS Take by mouth  Patient not taking: Reported on 11/23/2021    Historical Provider, MD        Allergies:       Metformin, Avocado, Banana, Codeine, Cucumber extract, Food, Nut [peanut-containing drug products], Other, Seasonal, and Percocet [oxycodone-acetaminophen]    Social History:     Tobacco:    reports that she has never smoked. She has never used smokeless tobacco.  Alcohol:      reports current alcohol use. Drug Use:  reports no history of drug use. Family History:     Family History   Problem Relation Age of Onset    Stroke Father     Heart Disease Father     Diabetes Father     High Blood Pressure Father     Diabetes type 2  Brother     Heart Attack Maternal Grandfather        Review of Systems:     Positive and Negative as described in HPI    Review of Systems   Constitutional: Negative for fatigue (improving energy). Physical Exam:   Vitals:  /78   Pulse 102   Resp 14   Ht 5' 3\" (1.6 m)   Wt 222 lb (100.7 kg)   SpO2 100%   BMI 39.33 kg/m²     Physical Exam  Constitutional:       General: She is not in acute distress. Appearance: Normal appearance. She is obese. She is not ill-appearing or toxic-appearing. HENT:      Head: Normocephalic. Cardiovascular:      Rate and Rhythm: Regular rhythm. Tachycardia present. Heart sounds: Normal heart sounds. No murmur heard. Pulmonary:      Effort: Pulmonary effort is normal. No respiratory distress. Breath sounds: Normal breath sounds. No stridor.  No wheezing, rhonchi or rales. Neurological:      Mental Status: She is alert and oriented to person, place, and time. Psychiatric:         Mood and Affect: Mood normal.         Behavior: Behavior normal.         Thought Content: Thought content normal.         Judgment: Judgment normal.       Data:     Lab Results   Component Value Date     04/08/2021    K 4.1 04/08/2021     04/08/2021    CO2 25 04/08/2021    BUN 11 04/08/2021    CREATININE 0.56 04/08/2021    GLUCOSE 212 07/18/2021    GLUCOSE 299 09/26/2020    PROT 7.1 04/08/2021    LABALBU 3.9 04/08/2021    BILITOT 0.46 04/08/2021    ALKPHOS 63 04/08/2021    AST 14 04/08/2021    ALT 22 04/08/2021     Lab Results   Component Value Date    WBC 13.8 04/08/2021    RBC 4.76 04/08/2021    RBC 4.97 09/26/2020    HGB 14.2 04/08/2021    HCT 43.0 04/08/2021    MCV 90.3 04/08/2021    MCH 29.8 04/08/2021    MCHC 33.0 04/08/2021    RDW 12.9 04/08/2021     04/08/2021    MPV 9.7 04/08/2021     No results found for: TSH  Lab Results   Component Value Date    CHOL 144 04/08/2021    HDL 40 04/08/2021    LABA1C 13.4 04/18/2022       Assessment/Plan:      Diagnosis Orders   1. Type 2 diabetes mellitus without complication, without long-term current use of insulin ([facility] [address]        Reviewed 4/18/2022 A1c of 13.4%. Discussed this is considered uncontrolled diabetes   She is out of test strips. Based on this, I am unable to review her glucose readings to adjust her medications today. She does note that prior to running out of her glucose strips her fasting glucose was averaging 100. Based on her fasting average glucose and once weekly hypoglycemic episodes, will not titrate medications up at this time. -- Continue pioglitazone 30 mg daily, glimepiride 2 mg twice daily, insulin detemir 10 units nightly   Discussed my concern with her most recent A1c and I question if her current insulin regimen is not adequate enough to decrease this efficiently.   Will work to help the patient get her glucose strips cost efficiently so that she can continue to monitor at home. Offered samples of continuous glucose monitors in office, patient declines.  She will call office every 2 weeks with her glucose readings. Based on this, will consider increasing her pioglitazone and/or long-acting insulin   Follow-up in 8 weeks for wellness and DM follow-up    Completed Refills   Requested Prescriptions      No prescriptions requested or ordered in this encounter       No orders of the defined types were placed in this encounter. No results found for this visit on 05/16/22. Return in about 9 weeks (around 7/18/2022), or if symptoms worsen or fail to improve, for Wellness + DM f/u + POC A1c.     Electronically signed by BRIDGETT Mireles CNP on 05/16/22 at 10:46 AM.

## 2022-05-16 NOTE — PATIENT INSTRUCTIONS
SURVEY:    You may be receiving a survey from [email or web address] regarding your visit today. Please complete the survey to enable us to provide the highest quality of care to you and your family. If you cannot score us a very good on any question, please call the office to discuss how we could of made your experience a very good one. Thank you.

## 2022-07-07 RX ORDER — GLIMEPIRIDE 2 MG/1
TABLET ORAL
Qty: 180 TABLET | Refills: 2 | Status: SHIPPED | OUTPATIENT
Start: 2022-07-07

## 2022-08-22 RX ORDER — PIOGLITAZONEHYDROCHLORIDE 30 MG/1
TABLET ORAL
Qty: 90 TABLET | Refills: 1 | OUTPATIENT
Start: 2022-08-22

## 2022-10-07 DIAGNOSIS — F33.1 MODERATE EPISODE OF RECURRENT MAJOR DEPRESSIVE DISORDER (HCC): ICD-10-CM

## 2022-10-07 RX ORDER — BUPROPION HYDROCHLORIDE 150 MG/1
150 TABLET ORAL EVERY MORNING
Qty: 90 TABLET | Refills: 1 | Status: SHIPPED | OUTPATIENT
Start: 2022-10-07

## 2022-10-19 ENCOUNTER — OFFICE VISIT (OUTPATIENT)
Dept: PRIMARY CARE CLINIC | Age: 37
End: 2022-10-19
Payer: COMMERCIAL

## 2022-10-19 VITALS
SYSTOLIC BLOOD PRESSURE: 100 MMHG | HEIGHT: 63 IN | BODY MASS INDEX: 40.27 KG/M2 | DIASTOLIC BLOOD PRESSURE: 70 MMHG | TEMPERATURE: 96.6 F | WEIGHT: 227.25 LBS | HEART RATE: 116 BPM | OXYGEN SATURATION: 97 %

## 2022-10-19 DIAGNOSIS — J45.41 MODERATE PERSISTENT ASTHMA WITH EXACERBATION: Primary | ICD-10-CM

## 2022-10-19 DIAGNOSIS — J45.41 MODERATE PERSISTENT ASTHMATIC BRONCHITIS WITH ACUTE EXACERBATION: ICD-10-CM

## 2022-10-19 DIAGNOSIS — R06.2 WHEEZING: ICD-10-CM

## 2022-10-19 DIAGNOSIS — R05.1 ACUTE COUGH: ICD-10-CM

## 2022-10-19 PROCEDURE — 99213 OFFICE O/P EST LOW 20 MIN: CPT | Performed by: NURSE PRACTITIONER

## 2022-10-19 RX ORDER — PIOGLITAZONEHYDROCHLORIDE 30 MG/1
TABLET ORAL
Qty: 90 TABLET | Refills: 1 | Status: SHIPPED | OUTPATIENT
Start: 2022-10-19

## 2022-10-19 RX ORDER — NEBULIZER ACCESSORIES
1 KIT MISCELLANEOUS DAILY PRN
Qty: 1 KIT | Refills: 0 | Status: SHIPPED | OUTPATIENT
Start: 2022-10-19 | End: 2022-10-19 | Stop reason: CLARIF

## 2022-10-19 RX ORDER — PREDNISONE 20 MG/1
20 TABLET ORAL 2 TIMES DAILY
Qty: 10 TABLET | Refills: 0 | Status: SHIPPED | OUTPATIENT
Start: 2022-10-19 | End: 2022-10-24

## 2022-10-19 RX ORDER — IPRATROPIUM BROMIDE AND ALBUTEROL SULFATE 2.5; .5 MG/3ML; MG/3ML
1 SOLUTION RESPIRATORY (INHALATION) EVERY 4 HOURS PRN
Qty: 360 ML | Refills: 0 | Status: SHIPPED | OUTPATIENT
Start: 2022-10-19

## 2022-10-19 RX ORDER — AZITHROMYCIN 250 MG/1
TABLET, FILM COATED ORAL
Qty: 6 TABLET | Refills: 0 | Status: SHIPPED | OUTPATIENT
Start: 2022-10-19 | End: 2022-10-28

## 2022-10-19 ASSESSMENT — ENCOUNTER SYMPTOMS
COUGH: 1
WHEEZING: 1
SHORTNESS OF BREATH: 0

## 2022-10-19 NOTE — PROGRESS NOTES
Name: Denice Pritchett  : 1985         Chief Complaint:     Chief Complaint   Patient presents with    Cough     -2 days, started with a headache and progressed into cough and sinus headache and runny nose,  has it as well.  -treating with Robitussin and Mucinex DM    Sinus Problem       History of Present Illness:      Denice Pritchett is a 44 y.o.  female who presents with Cough (-2 days, started with a headache and progressed into cough and sinus headache and runny nose,  has it as well./-treating with Robitussin and Mucinex DM) and Sinus Problem      HPI    The patient presents with with cough that began 1-2 days ago. Cough is \"barky\". Cough is productive. Denies fever. Denies chills. Admits feeling warm. Denies body aches. Admits headache. Admits wheezing. Denies SOB. She has taken robitussin and mucinex DM. She took at-home covid test yesterday and results negative. Her  has similar symptoms. Denies diarrhea or vomiting. She has been using albuterol inhaler every 4 hours.      Past Medical History:     Past Medical History:   Diagnosis Date    Allergic rhinitis     Anxiety     Asthma     Depression     DM (diabetes mellitus) ([facility] [address]     Hyperlipidemia     Hypertension     Migraines       Reviewed all health maintenance requirements and ordered appropriate tests  Health Maintenance Due   Topic Date Due    Hepatitis B vaccine (1 of 3 - Risk 3-dose series) Never done    DTaP/Tdap/Td vaccine (1 - Tdap) Never done    Cervical cancer screen  Never done    COVID-19 Vaccine (2 - Booster for Reginaldo series) 2021    Diabetic microalbuminuria test  2022    Lipids  2022    Diabetic retinal exam  2022    A1C test (Diabetic or Prediabetic)  2022    Flu vaccine (1) 2022       Past Surgical History:     Past Surgical History:   Procedure Laterality Date    CHOLECYSTECTOMY      [facility]    KNEE ARTHROSCOPY  2001    right knee    TONSILLECTOMY  1988 Medications:       Prior to Admission medications    Medication Sig Start Date End Date Taking? Authorizing Provider   buPROPion (WELLBUTRIN XL) 150 MG extended release tablet Take 1 tablet by mouth every morning 10/7/22  Yes BRIDGETT Mckee CNP   glimepiride (AMARYL) 2 MG tablet TAKE 1 TABLET BY MOUTH TWICE A DAY WITH MEALS 7/7/22  Yes BRIDGETT Mckee CNP   pioglitazone (ACTOS) 30 MG tablet TAKE 1 TABLET BY MOUTH EVERY DAY 5/16/22  Yes BRIDGETT Mckee CNP   insulin detemir (LEVEMIR FLEXTOUCH) 100 UNIT/ML injection pen Inject 10 Units into the skin nightly 4/18/22  Yes BRIDGETT Mckee CNP   atorvastatin (LIPITOR) 10 MG tablet TAKE 1 TABLET BY MOUTH EVERY DAY 1/31/22  Yes BRIDGETT Mckee CNP   escitalopram (LEXAPRO) 20 MG tablet TAKE 1 TABLET BY MOUTH EVERY DAY 11/1/21  Yes BRIDGETT Mckee CNP   lisinopril (PRINIVIL;ZESTRIL) 5 MG tablet Take 5 mg by mouth daily 1/21/20  Yes Historical Provider, MD   levonorgestrel (MIRENA) IUD 52 mg 1 each by Intrauterine route once   Yes Historical Provider, MD   albuterol sulfate HFA (PROAIR HFA) 108 (90 Base) MCG/ACT inhaler Inhale 2 puffs into the lungs every 6 hours as needed for Wheezing or Shortness of Breath 8/12/20  Yes BRIDGETT Mckee CNP   Insulin Pen Needle ([employer] PEN NEEDLES 31G) 31G X 8 MM MISC 1 each by Does not apply route daily 4/25/22   BRIDGETT Mckee CNP   blood glucose monitor strips Test 3 times a day & as needed for symptoms of irregular blood glucose. Dispense sufficient amount for indicated testing frequency plus additional to accommodate PRN testing needs.  4/18/22   BRIDGETT Mckee CNP   glucose monitoring kit (FREESTYLE) monitoring kit 1 kit by Does not apply route daily 1/6/21   BRIDGETT Mckee CNP        Allergies:       Metformin, Avocado, Banana, Codeine, Cucumber extract, Food, Nut [peanut-containing drug products], Other, Seasonal, and Percocet [oxycodone-acetaminophen]    Social History:     Tobacco:    reports that she has never smoked. She has never used smokeless tobacco.  Alcohol:      reports current alcohol use. Drug Use:  reports no history of drug use. Family History:     Family History   Problem Relation Age of Onset    Stroke Father     Heart Disease Father     Diabetes Father     High Blood Pressure Father     Diabetes type 2  Brother     Heart Attack Maternal Grandfather        Review of Systems:     Positive and Negative as described in HPI    Review of Systems   Constitutional:  Negative for chills and fever. Respiratory:  Positive for cough and wheezing. Negative for shortness of breath. Neurological:  Positive for headaches. Physical Exam:   Vitals:  /70   Pulse (!) 116   Temp (!) 96.6 °F (35.9 °C)   Wt 227 lb 4 oz (103.1 kg)   SpO2 97%    L/min Comment: predicted 345  BMI 40.26 kg/m²     Physical Exam  Constitutional:       General: She is not in acute distress. Appearance: Normal appearance. She is ill-appearing. She is not toxic-appearing. HENT:      Head: Normocephalic. Right Ear: Tympanic membrane, ear canal and external ear normal. There is no impacted cerumen. Left Ear: Tympanic membrane, ear canal and external ear normal. There is no impacted cerumen. Nose: Nose normal. No congestion or rhinorrhea. Mouth/Throat:      Mouth: Mucous membranes are moist.      Pharynx: No oropharyngeal exudate or posterior oropharyngeal erythema. Cardiovascular:      Rate and Rhythm: Regular rhythm. Tachycardia present. Heart sounds: Normal heart sounds. No murmur heard. Pulmonary:      Breath sounds: No stridor. Wheezing (bilateral expiratory wheeze upper lobes) present. No rhonchi or rales. Comments: Persistent, tight-sounding cough  Musculoskeletal:      Cervical back: Neck supple. Lymphadenopathy:      Cervical: No cervical adenopathy.    Neurological:      Mental Status: She is alert and oriented to person, place, and time. Psychiatric:         Mood and Affect: Mood normal.         Behavior: Behavior normal.         Thought Content: Thought content normal.         Judgment: Judgment normal.       Data:     Lab Results   Component Value Date/Time     04/08/2021 09:17 AM    K 4.1 04/08/2021 09:17 AM     04/08/2021 09:17 AM    CO2 25 04/08/2021 09:17 AM    BUN 11 04/08/2021 09:17 AM    CREATININE 0.56 04/08/2021 09:17 AM    GLUCOSE 212 07/18/2021 05:32 PM    GLUCOSE 299 09/26/2020 09:52 AM    PROT 7.1 04/08/2021 09:17 AM    LABALBU 3.9 04/08/2021 09:17 AM    BILITOT 0.46 04/08/2021 09:17 AM    ALKPHOS 63 04/08/2021 09:17 AM    AST 14 04/08/2021 09:17 AM    ALT 22 04/08/2021 09:17 AM     Lab Results   Component Value Date/Time    WBC 13.8 04/08/2021 09:17 AM    RBC 4.76 04/08/2021 09:17 AM    RBC 4.97 09/26/2020 09:52 AM    HGB 14.2 04/08/2021 09:17 AM    HCT 43.0 04/08/2021 09:17 AM    MCV 90.3 04/08/2021 09:17 AM    MCH 29.8 04/08/2021 09:17 AM    MCHC 33.0 04/08/2021 09:17 AM    RDW 12.9 04/08/2021 09:17 AM     04/08/2021 09:17 AM    MPV 9.7 04/08/2021 09:17 AM     No results found for: TSH  Lab Results   Component Value Date/Time    CHOL 144 04/08/2021 09:17 AM    HDL 40 04/08/2021 09:17 AM    LABA1C 13.4 04/18/2022 09:22 AM       Assessment/Plan:      Diagnosis Orders   1. Acute cough [R05.1 (ICD-10-CM)]        2. Sinus problem [J34.9 (ICD-10-CM)]          - Vital signs stable, aside from tachycardia. However, she had persistent, tight cough throughout the duration of visit which is probably attributing to tachycardia. - Rx azithromycin  - Rx prednisone 20 mg twice daily x5 days  - Continue albuterol inhaler as needed  - Add nebulized DuoNeb. Nebulizer ordered.   - Reviewed worsening signs and symptoms of asthma exacerbation and when to seek immediate care  - Notify office if symptoms worsen or persist    Completed Refills   Requested Prescriptions      No prescriptions requested or ordered in this encounter       No orders of the defined types were placed in this encounter. No results found for this visit on 10/19/22. No follow-ups on file.     Electronically signed by BRIDGETT Duff CNP on 10/19/22 at 11:32 AM.

## 2022-10-19 NOTE — PATIENT INSTRUCTIONS
SURVEY:    You may be receiving a survey from [employer] regarding your visit today. Please complete the survey to enable us to provide the highest quality of care to you and your family. If you cannot score us a very good on any question, please call the office to discuss how we could of made your experience a very good one. Thank you.

## 2022-10-25 ENCOUNTER — TELEPHONE (OUTPATIENT)
Dept: PRIMARY CARE CLINIC | Age: 37
End: 2022-10-25

## 2022-10-25 NOTE — TELEPHONE ENCOUNTER
Was she able to  nebulizer and use duoneb? Patient was acute while in office, given her asthma and her symptoms not improving, she will need to be reevaluated in office.

## 2022-10-25 NOTE — TELEPHONE ENCOUNTER
Patient called with c/o still having a cough and its not getting better. Was seen on 10/19. Would you recommend a change in meds or appt?

## 2022-10-28 ENCOUNTER — OFFICE VISIT (OUTPATIENT)
Dept: PRIMARY CARE CLINIC | Age: 37
End: 2022-10-28
Payer: COMMERCIAL

## 2022-10-28 VITALS — HEART RATE: 137 BPM | WEIGHT: 227 LBS | BODY MASS INDEX: 40.22 KG/M2 | RESPIRATION RATE: 18 BRPM

## 2022-10-28 DIAGNOSIS — J20.9 ACUTE BRONCHITIS, UNSPECIFIED ORGANISM: Primary | ICD-10-CM

## 2022-10-28 PROCEDURE — 99213 OFFICE O/P EST LOW 20 MIN: CPT | Performed by: FAMILY MEDICINE

## 2022-10-28 RX ORDER — PREDNISONE 20 MG/1
20 TABLET ORAL 2 TIMES DAILY
Qty: 10 TABLET | Refills: 0 | Status: SHIPPED | OUTPATIENT
Start: 2022-10-28 | End: 2022-11-02

## 2022-10-28 RX ORDER — AMOXICILLIN AND CLAVULANATE POTASSIUM 875; 125 MG/1; MG/1
1 TABLET, FILM COATED ORAL 2 TIMES DAILY
Qty: 20 TABLET | Refills: 0 | Status: SHIPPED | OUTPATIENT
Start: 2022-10-28 | End: 2022-11-07

## 2022-10-28 NOTE — PROGRESS NOTES
Patient is here with complaints of cough. She said it is a constant cough. She also mentioned that she has had some nasal congestion. She said that this has been going on for about two weeks. She did have an appointment with Navneet Milton and was given Zithromax, Prednisone, and a Nebulizer for treatment. She states the medications did not help. Patients BP was 141/83, will recheck. CURRENT ALLERGIES: Metformin, Avocado, Banana, Codeine, Cucumber extract, Food, Nut [peanut-containing drug products], Other, Seasonal, and Percocet [oxycodone-acetaminophen]    SOCIAL HISTORY:   Social History     Tobacco Use    Smoking status: Never    Smokeless tobacco: Never   Vaping Use    Vaping Use: Never used   Substance Use Topics    Alcohol use: Yes     Comment: rarely    Drug use: No       She has a current medication list which includes the following prescription(s): amoxicillin-clavulanate, prednisone, pioglitazone, ipratropium-albuterol, bupropion, glimepiride, kroger pen needles 31g, levemir flextouch, blood glucose test strips, atorvastatin, escitalopram, glucose monitoring, lisinopril, levonorgestrel, and albuterol sulfate hfa. Review of Systems:  Constitutional: neg for fever, chills, neg for headache  Eyes: negative for visual disturbance   ENT: positive  for sore throat , positive nasal congestion, neg for ear pain  Respiratory: positive for cough, positive for shortness of breath neg for sputum   Cardiovascular: negative for chest pain,pnd or palpitations  Gastrointestinal: negative for abd pain, nausea, vomiting, diarrhea or constipation  Integument/breast: negative for skin rash or lesions  Neurological: negative for unilateral weakness, numbness or tingling. No joint pain,bodyache     Objective:  Pulse (!) 137   Resp 18   Wt 227 lb (103 kg)   BMI 40.22 kg/m²    GEN:  She is alert and oriented.  no distress  EAR:   RIGHT ear: Canal: normal and Tympanic membrane: normal landmarks and mobility    LEFT ear: Canal: normal and Tympanic membrane: erythematous  NOSE:  boggy mucosa with purulent mucus drainage  PHARYNX:  erythematous without enlarged tonsils or exudate  NECK:  normal, supple, no lymphadenopathy  CVS:   Regular rate and rhythm, no murmur  PULM:  bilateral expiratory wheezing with scattered rhonchi  ABD:   BS's positive, abd is soft and nonfocal.   EXT:    no edema    Assessment:  1.  Acute bronchitis, unspecified organism      Plan:  Encouraged increased oral fluids  Medications:  augmentin 875 mg bid 10 days  Aerosols q4 prn  Medications: Prednisone 20 mg po BID x 5 days  May use OTC meds:    Tylenol 500 mg po q6 hrs PRN fever   Follow up PRN if symptoms do not resolve    Electronically signed by Gautam Brock MD on 10/28/2022 at 1:51 PM

## 2022-10-31 RX ORDER — ESCITALOPRAM OXALATE 20 MG/1
20 TABLET ORAL DAILY
Qty: 90 TABLET | Refills: 3 | Status: SHIPPED | OUTPATIENT
Start: 2022-10-31

## 2022-11-17 ENCOUNTER — OFFICE VISIT (OUTPATIENT)
Dept: PRIMARY CARE CLINIC | Age: 37
End: 2022-11-17
Payer: COMMERCIAL

## 2022-11-17 VITALS
HEART RATE: 104 BPM | DIASTOLIC BLOOD PRESSURE: 80 MMHG | OXYGEN SATURATION: 97 % | SYSTOLIC BLOOD PRESSURE: 124 MMHG | WEIGHT: 227.25 LBS | BODY MASS INDEX: 40.27 KG/M2 | TEMPERATURE: 97 F

## 2022-11-17 DIAGNOSIS — E11.9 TYPE 2 DIABETES MELLITUS WITHOUT COMPLICATION, WITHOUT LONG-TERM CURRENT USE OF INSULIN (HCC): Primary | ICD-10-CM

## 2022-11-17 DIAGNOSIS — Z12.4 CERVICAL CANCER SCREENING: ICD-10-CM

## 2022-11-17 DIAGNOSIS — F41.9 ANXIETY: ICD-10-CM

## 2022-11-17 DIAGNOSIS — Z13.0 SCREENING FOR DEFICIENCY ANEMIA: ICD-10-CM

## 2022-11-17 DIAGNOSIS — E78.2 MODERATE MIXED HYPERLIPIDEMIA NOT REQUIRING STATIN THERAPY: ICD-10-CM

## 2022-11-17 DIAGNOSIS — Z00.00 WELLNESS EXAMINATION: ICD-10-CM

## 2022-11-17 PROCEDURE — 3046F HEMOGLOBIN A1C LEVEL >9.0%: CPT | Performed by: NURSE PRACTITIONER

## 2022-11-17 PROCEDURE — 99213 OFFICE O/P EST LOW 20 MIN: CPT | Performed by: NURSE PRACTITIONER

## 2022-11-17 NOTE — PROGRESS NOTES
Name: Raleigh Hodge  : 1985         Chief Complaint:     Chief Complaint   Patient presents with    Diabetes     - denies diabetic ed  - mostly complaint for low carb diet  - exercises at the gym 3 times a week  - admits testing at home  - average   - denies hypoglycemia  - denies current eye exam  - denies any symptoms at this time  - admits medication compliant           History of Present Illness:      Raleigh Hodge is a 44 y.o.  female who presents with Diabetes (- denies diabetic ed/- mostly complaint for low carb diet/- exercises at the gym 3 times a week/- admits testing at home/- average /- denies hypoglycemia/- denies current eye exam/- denies any symptoms at this time/- admits medication compliant/)      HPI    DM:  Diabetic education: no  Diabetic diet/low carb diet compliance:  mostly compliant with low carb diet  Current exercise: gym three x/week  Frequency of glucose testing at home: 3 times weekly  Home blood sugar records: average glucose 120 testing 1 hour after dinner  Glucometer at home: yes, working well  History of hypoglycemic episodes: no  Most recent eye exam: no recent eye exams  Last diabetic foot check: 2022  Current symptoms: Denies excessive hunger, excessive thirst, or increased frequency of urination. Denies fatigue, numbness/tingling in hands or feet, or major changes in weight. Denies vision changes or sores that are slow to heal.    reports that she has never smoked.  She has never used smokeless tobacco.   Medication compliance:  compliant all of the time  Medication Therapy: Glimepiride 2mg BID, insulin detemir (levemir) 10 units QHS, pioglitazone 30mg QD  Hemoglobin A1C (%)   Date Value   2022 13.4   2021 11.8   2021 8.5   2021 9.6 (H)   2021 10.6       Past Medical History:     Past Medical History:   Diagnosis Date    Allergic rhinitis     Anxiety     Asthma     Depression     DM (diabetes mellitus) ([facility] [address]     Hyperlipidemia Hypertension     Migraines       Reviewed all health maintenance requirements and ordered appropriate tests  Health Maintenance Due   Topic Date Due    Hepatitis B vaccine (1 of 3 - Risk 3-dose series) Never done    DTaP/Tdap/Td vaccine (1 - Tdap) Never done    Cervical cancer screen  Never done    COVID-19 Vaccine (3 - Booster for Reginaldo series) 12/27/2021    Diabetic microalbuminuria test  04/08/2022    Lipids  04/08/2022    Diabetic retinal exam  06/30/2022    A1C test (Diabetic or Prediabetic)  07/18/2022    Flu vaccine (1) 08/01/2022       Past Surgical History:     Past Surgical History:   Procedure Laterality Date    CHOLECYSTECTOMY  2007    [facility]    KNEE ARTHROSCOPY  2001    right knee    TONSILLECTOMY  1988        Medications:       Prior to Admission medications    Medication Sig Start Date End Date Taking?  Authorizing Provider   escitalopram (LEXAPRO) 20 MG tablet Take 1 tablet by mouth daily 10/31/22  Yes BRIDGETT Coles CNP   pioglitazone (ACTOS) 30 MG tablet TAKE 1 TABLET BY MOUTH EVERY DAY 10/19/22  Yes BRIDGETT Coles CNP   buPROPion (WELLBUTRIN XL) 150 MG extended release tablet Take 1 tablet by mouth every morning 10/7/22  Yes BRIDGETT Coles CNP   glimepiride (AMARYL) 2 MG tablet TAKE 1 TABLET BY MOUTH TWICE A DAY WITH MEALS 7/7/22  Yes BRIDGETT Coles CNP   insulin detemir (LEVEMIR FLEXTOUCH) 100 UNIT/ML injection pen Inject 10 Units into the skin nightly 4/18/22  Yes BRIDGETT Coles CNP   atorvastatin (LIPITOR) 10 MG tablet TAKE 1 TABLET BY MOUTH EVERY DAY 1/31/22  Yes BRIDGETT Coles CNP   lisinopril (PRINIVIL;ZESTRIL) 5 MG tablet Take 5 mg by mouth daily 1/21/20  Yes Historical Provider, MD   levonorgestrel (MIRENA) IUD 52 mg 1 each by Intrauterine route once   Yes Historical Provider, MD   albuterol sulfate HFA (PROAIR HFA) 108 (90 Base) MCG/ACT inhaler Inhale 2 puffs into the lungs every 6 hours as needed for Wheezing or Shortness of Breath 8/12/20  Yes Paula Riddles, APRN - CNP   ipratropium-albuterol (DUONEB) 0.5-2.5 (3) MG/3ML SOLN nebulizer solution Inhale 3 mLs into the lungs every 4 hours as needed for Shortness of Breath or Other (wheezing)  Patient not taking: Reported on 11/17/2022 10/19/22   Paula Riddles, APRN - CNP   Insulin Pen Needle ([employer] PEN NEEDLES 31G) 31G X 8 MM MISC 1 each by Does not apply route daily 4/25/22   Paula Riddles, APRN - CNP   blood glucose monitor strips Test 3 times a day & as needed for symptoms of irregular blood glucose. Dispense sufficient amount for indicated testing frequency plus additional to accommodate PRN testing needs. 4/18/22   Paula RiddlesBRIDGETT - CNP   glucose monitoring kit (FREESTYLE) monitoring kit 1 kit by Does not apply route daily 1/6/21   Paula Riddles APRN - CNP        Allergies:       Metformin, Avocado, Banana, Codeine, Cucumber extract, Food, Nut [peanut-containing drug products], Other, Seasonal, and Percocet [oxycodone-acetaminophen]    Social History:     Tobacco:    reports that she has never smoked. She has never used smokeless tobacco.  Alcohol:      reports current alcohol use. Drug Use:  reports no history of drug use. Family History:     Family History   Problem Relation Age of Onset    Stroke Father     Heart Disease Father     Diabetes Father     High Blood Pressure Father     Diabetes type 2  Brother     Heart Attack Maternal Grandfather        Review of Systems:     Positive and Negative as described in HPI    Review of Systems   Endocrine: Negative for polydipsia, polyphagia and polyuria. Physical Exam:   Vitals:  /80   Pulse (!) 104   Temp 97 °F (36.1 °C)   Wt 227 lb 4 oz (103.1 kg)   SpO2 97%   BMI 40.27 kg/m²     Physical Exam  Constitutional:       General: She is not in acute distress. Appearance: Normal appearance. She is obese. She is not ill-appearing or toxic-appearing.    Cardiovascular:      Rate and Rhythm: Regular rhythm. Tachycardia present. Heart sounds: Normal heart sounds. No murmur heard. Pulmonary:      Effort: Pulmonary effort is normal. No respiratory distress. Breath sounds: Normal breath sounds. No stridor. No wheezing, rhonchi or rales. Neurological:      Mental Status: She is alert. Psychiatric:         Mood and Affect: Mood normal.         Behavior: Behavior normal.         Thought Content: Thought content normal.         Judgment: Judgment normal.       Data:     Lab Results   Component Value Date/Time     04/08/2021 09:17 AM    K 4.1 04/08/2021 09:17 AM     04/08/2021 09:17 AM    CO2 25 04/08/2021 09:17 AM    BUN 11 04/08/2021 09:17 AM    CREATININE 0.56 04/08/2021 09:17 AM    GLUCOSE 212 07/18/2021 05:32 PM    GLUCOSE 299 09/26/2020 09:52 AM    PROT 7.1 04/08/2021 09:17 AM    LABALBU 3.9 04/08/2021 09:17 AM    BILITOT 0.46 04/08/2021 09:17 AM    ALKPHOS 63 04/08/2021 09:17 AM    AST 14 04/08/2021 09:17 AM    ALT 22 04/08/2021 09:17 AM     Lab Results   Component Value Date/Time    WBC 13.8 04/08/2021 09:17 AM    RBC 4.76 04/08/2021 09:17 AM    RBC 4.97 09/26/2020 09:52 AM    HGB 14.2 04/08/2021 09:17 AM    HCT 43.0 04/08/2021 09:17 AM    MCV 90.3 04/08/2021 09:17 AM    MCH 29.8 04/08/2021 09:17 AM    MCHC 33.0 04/08/2021 09:17 AM    RDW 12.9 04/08/2021 09:17 AM     04/08/2021 09:17 AM    MPV 9.7 04/08/2021 09:17 AM     No results found for: TSH  Lab Results   Component Value Date/Time    CHOL 144 04/08/2021 09:17 AM    HDL 40 04/08/2021 09:17 AM    LABA1C 13.4 04/18/2022 09:22 AM       Assessment/Plan:      Diagnosis Orders   1. Type 2 diabetes mellitus without complication, without long-term current use of insulin (HCC)  CBC    Comprehensive Metabolic Panel    Hemoglobin A1C    Microalbumin, Ur      2. Anxiety  CBC    TSH With Reflex Ft4      3. Moderate mixed hyperlipidemia not requiring statin therapy  CBC    Lipid Panel      4. Screening for deficiency anemia  CBC      5. Wellness examination  CBC    Comprehensive Metabolic Panel    Hemoglobin A1C    Lipid Panel    Microalbumin, Ur    TSH With Reflex Ft4      6. Cervical cancer screening  [address], [place], [address] [facility], Gynecology, [place]        DM:  - Due for lab work at this time to trend A1c   - For the time being, continue Glimepiride 2mg BID, insulin detemir (levemir) 10 units QHS, pioglitazone 30mg QD  -- Complete A1c. Will adjust medication as needed based on results   -- F/u 3 months for close monitoring   - Urine microalbumin ordered  - Encouraged routine diabetic eye exams  -- Counseled on diabetic diet and routine physical activity  - Offered referral to diabetes education/nutritionist, patient declines    Hyperlipidemia:   -- Complete lipid panel     Wellness:  - Due for cervical cancer screening.   Referral to [address] [place] [address] placed today, Dior Dennison CNM per patient preference  - Completed flu vaccine 2 weeks ago, will request results      Completed Refills   Requested Prescriptions      No prescriptions requested or ordered in this encounter       Orders Placed This Encounter   Procedures    CBC     Standing Status:   Future     Standing Expiration Date:   11/17/2023    Comprehensive Metabolic Panel     Standing Status:   Future     Standing Expiration Date:   11/17/2023    Hemoglobin A1C     Standing Status:   Future     Standing Expiration Date:   11/17/2023    Lipid Panel     Standing Status:   Future     Standing Expiration Date:   11/17/2023    Microalbumin, Ur     Standing Status:   Future     Standing Expiration Date:   11/17/2023    TSH With Reflex Ft4     Standing Status:   Future     Standing Expiration Date:   11/17/2023    Avila Canales CNM, Gynecology, [place]     Referral Priority:   Routine     Referral Type:   Eval and Treat     Referral Reason:   Specialty Services Required     Referred to Provider:   BRIDGETT Rivers CNM     Requested Specialty:   Obstetrics & Gynecology     Number of Visits Requested:   1          No results found for this visit on 11/17/22. Return in 3 months (on 2/17/2023), or if symptoms worsen or fail to improve, for wellness +  DM, HLD f/u .     Electronically signed by BRIDGETT Russo CNP on 11/17/22 at 10:46 AM.

## 2022-11-17 NOTE — PATIENT INSTRUCTIONS
SURVEY:    You may be receiving a survey from [employer] regarding your visit today. Please complete the survey to enable us to provide the highest quality of care to you and your family. If you cannot score us a very good on any question, please call the office to discuss how we could have made your experience a very good one. Thank you.

## 2023-02-08 ENCOUNTER — OFFICE VISIT (OUTPATIENT)
Dept: PRIMARY CARE CLINIC | Age: 38
End: 2023-02-08
Payer: COMMERCIAL

## 2023-02-08 VITALS
SYSTOLIC BLOOD PRESSURE: 124 MMHG | HEART RATE: 122 BPM | OXYGEN SATURATION: 98 % | WEIGHT: 221 LBS | TEMPERATURE: 97.5 F | RESPIRATION RATE: 18 BRPM | DIASTOLIC BLOOD PRESSURE: 86 MMHG | HEIGHT: 62 IN | BODY MASS INDEX: 40.67 KG/M2

## 2023-02-08 DIAGNOSIS — R05.9 COUGH, UNSPECIFIED TYPE: ICD-10-CM

## 2023-02-08 DIAGNOSIS — U07.1 COVID: Primary | ICD-10-CM

## 2023-02-08 PROCEDURE — 99214 OFFICE O/P EST MOD 30 MIN: CPT | Performed by: NURSE PRACTITIONER

## 2023-02-08 RX ORDER — PREDNISONE 20 MG/1
20 TABLET ORAL 2 TIMES DAILY
Qty: 10 TABLET | Refills: 0 | Status: SHIPPED | OUTPATIENT
Start: 2023-02-08 | End: 2023-02-13

## 2023-02-08 ASSESSMENT — PATIENT HEALTH QUESTIONNAIRE - PHQ9
9. THOUGHTS THAT YOU WOULD BE BETTER OFF DEAD, OR OF HURTING YOURSELF: 0
SUM OF ALL RESPONSES TO PHQ9 QUESTIONS 1 & 2: 0
SUM OF ALL RESPONSES TO PHQ QUESTIONS 1-9: 0
10. IF YOU CHECKED OFF ANY PROBLEMS, HOW DIFFICULT HAVE THESE PROBLEMS MADE IT FOR YOU TO DO YOUR WORK, TAKE CARE OF THINGS AT HOME, OR GET ALONG WITH OTHER PEOPLE: 0
2. FEELING DOWN, DEPRESSED OR HOPELESS: 0
4. FEELING TIRED OR HAVING LITTLE ENERGY: 0
5. POOR APPETITE OR OVEREATING: 0
SUM OF ALL RESPONSES TO PHQ QUESTIONS 1-9: 0
SUM OF ALL RESPONSES TO PHQ QUESTIONS 1-9: 0
3. TROUBLE FALLING OR STAYING ASLEEP: 0
SUM OF ALL RESPONSES TO PHQ QUESTIONS 1-9: 0
7. TROUBLE CONCENTRATING ON THINGS, SUCH AS READING THE NEWSPAPER OR WATCHING TELEVISION: 0
8. MOVING OR SPEAKING SO SLOWLY THAT OTHER PEOPLE COULD HAVE NOTICED. OR THE OPPOSITE, BEING SO FIGETY OR RESTLESS THAT YOU HAVE BEEN MOVING AROUND A LOT MORE THAN USUAL: 0
1. LITTLE INTEREST OR PLEASURE IN DOING THINGS: 0

## 2023-02-08 ASSESSMENT — ENCOUNTER SYMPTOMS
COUGH: 1
SHORTNESS OF BREATH: 1
RHINORRHEA: 1

## 2023-02-08 NOTE — PATIENT INSTRUCTIONS
SURVEY:    You may be receiving a survey from [employer] regarding your visit today. Please complete the survey to enable us to provide the highest quality of care to you and your family. If you cannot score us a very good on any question, please call the office to discuss how we could of made your experience a very good one. Thank you.

## 2023-02-08 NOTE — PROGRESS NOTES
Name: Dung Adams  : 1985         Chief Complaint:     Chief Complaint   Patient presents with    Cough     Tested positive for covid as of today 23. Pt c/o cough, chills        History of Present Illness:      Dung Adams is a 44 y.o.  female who presents with Cough (Tested positive for covid as of today 23. Pt c/o cough, chills )      HPI    The patient presents with covid. She started with SOB and cough that began 5 days ago. 4 days ago she started with chills, body aches, and fatigue. Admits diarrhea and decreased appetite. Admits dizziness. Cough is sometimes productive. Admits increased sweating. Denies fever. Denies wheezing. Admits SOB with activity. Both her and her  tested positive for covid this morning. She is using duoneb nebulizer every 4 hours with benefit.      Past Medical History:     Past Medical History:   Diagnosis Date    Allergic rhinitis     Anxiety     Asthma     Depression     DM (diabetes mellitus) ([facility] [address]     Hyperlipidemia     Hypertension     Migraines       Reviewed all health maintenance requirements and ordered appropriate tests  Health Maintenance Due   Topic Date Due    HIV screen  Never done    Hepatitis C screen  Never done    Hepatitis B vaccine (1 of 3 - Risk 3-dose series) Never done    DTaP/Tdap/Td vaccine (1 - Tdap) Never done    Cervical cancer screen  Never done    COVID-19 Vaccine (3 - Booster for Reginaldo series) 2021    Diabetic Alb to Cr ratio (uACR) test  2022    Lipids  2022    GFR test (Diabetes, CKD 3-4, OR last GFR 15-59)  2022    Diabetic retinal exam  2022    A1C test (Diabetic or Prediabetic)  2022    Flu vaccine (1) 2022       Past Surgical History:     Past Surgical History:   Procedure Laterality Date    CHOLECYSTECTOMY      [facility]    KNEE ARTHROSCOPY      right knee    TONSILLECTOMY          Medications:       Prior to Admission medications    Medication Sig Start Date End Date Taking? Authorizing Provider   nirmatrelvir/ritonavir 300/100 (PAXLOVID, 300/100,) 20 x 150 MG & 10 x 100MG TBPK Take 3 tablets (two 150 mg nirmatrelvir and one 100 mg ritonavir tablets) by mouth every 12 hours for 5 days. 2/8/23 2/13/23 Yes BRIDGETT Mercado CNP   predniSONE (DELTASONE) 20 MG tablet Take 1 tablet by mouth 2 times daily for 5 days 2/8/23 2/13/23 Yes BRIDGETT Mercado CNP   escitalopram (LEXAPRO) 20 MG tablet Take 1 tablet by mouth daily 10/31/22  Yes BRIDGETT Mercado CNP   pioglitazone (ACTOS) 30 MG tablet TAKE 1 TABLET BY MOUTH EVERY DAY 10/19/22  Yes BRIDGETT Mercado CNP   ipratropium-albuterol (DUONEB) 0.5-2.5 (3) MG/3ML SOLN nebulizer solution Inhale 3 mLs into the lungs every 4 hours as needed for Shortness of Breath or Other (wheezing) 10/19/22  Yes BRIDGETT Mercado CNP   buPROPion (WELLBUTRIN XL) 150 MG extended release tablet Take 1 tablet by mouth every morning 10/7/22  Yes BRIDGETT Mercado CNP   glimepiride (AMARYL) 2 MG tablet TAKE 1 TABLET BY MOUTH TWICE A DAY WITH MEALS 7/7/22  Yes BRIDGETT Mercado CNP   Insulin Pen Needle ([employer] PEN NEEDLES 31G) 31G X 8 MM MISC 1 each by Does not apply route daily 4/25/22  Yes BRIDGETT Mercado CNP   insulin detemir (LEVEMIR FLEXTOUCH) 100 UNIT/ML injection pen Inject 10 Units into the skin nightly 4/18/22  Yes BRIDGETT Mercado CNP   blood glucose monitor strips Test 3 times a day & as needed for symptoms of irregular blood glucose. Dispense sufficient amount for indicated testing frequency plus additional to accommodate PRN testing needs.  4/18/22  BRIDGETT Bassett CNP   atorvastatin (LIPITOR) 10 MG tablet TAKE 1 TABLET BY MOUTH EVERY DAY 1/31/22  Yes BRIDGETT Mercado CNP   glucose monitoring kit (FREESTYLE) monitoring kit 1 kit by Does not apply route daily 1/6/21  Yes Marky Biggs APRN - CNP   lisinopril (PRINIVIL;ZESTRIL) 5 MG tablet Take 5 mg by mouth daily 1/21/20  Yes Historical Provider, MD   levonorgestrel (MIRENA) IUD 52 mg 1 each by Intrauterine route once   Yes Historical Provider, MD   albuterol sulfate HFA (PROAIR HFA) 108 (90 Base) MCG/ACT inhaler Inhale 2 puffs into the lungs every 6 hours as needed for Wheezing or Shortness of Breath 8/12/20  Yes BRIDGETT Weaver CNP        Allergies:       Metformin, Avocado, Banana, Codeine, Cucumber extract, Food, Nut [peanut-containing drug products], Other, Seasonal, and Percocet [oxycodone-acetaminophen]    Social History:     Tobacco:    reports that she has never smoked. She has never used smokeless tobacco.  Alcohol:      reports current alcohol use. Drug Use:  reports no history of drug use. Family History:     Family History   Problem Relation Age of Onset    Stroke Father     Heart Disease Father     Diabetes Father     High Blood Pressure Father     Diabetes type 2  Brother     Heart Attack Maternal Grandfather        Review of Systems:     Positive and Negative as described in HPI    Review of Systems   Constitutional:  Positive for chills. Negative for fever. HENT:  Positive for congestion and rhinorrhea. Respiratory:  Positive for cough and shortness of breath. Physical Exam:   Vitals:  /86   Pulse (!) 122   Temp 97.5 °F (36.4 °C)   Resp 18   Ht 5' 2\" (1.575 m)   Wt 221 lb (100.2 kg)   SpO2 98%    L/min Comment: est PF is 6.40  BMI 40.42 kg/m²     Physical Exam  Constitutional:       Appearance: Normal appearance. She is obese. She is ill-appearing. HENT:      Right Ear: Tympanic membrane, ear canal and external ear normal. There is no impacted cerumen. Left Ear: Tympanic membrane, ear canal and external ear normal. There is no impacted cerumen. Nose: Nose normal. No congestion or rhinorrhea. Mouth/Throat:      Mouth: Mucous membranes are moist.      Pharynx: No oropharyngeal exudate or posterior oropharyngeal erythema.    Cardiovascular:      Rate and Rhythm: Regular rhythm. Tachycardia present. Heart sounds: Normal heart sounds. No murmur heard. Pulmonary:      Effort: No respiratory distress. Breath sounds: No stridor. Wheezing (mild, faint expiratory wheezing upper lobes bilaterally) present. No rhonchi or rales. Comments: Persistent, tight sounding cough throughout duration of visit  Lymphadenopathy:      Cervical: No cervical adenopathy. Neurological:      Mental Status: She is alert. Data:     Lab Results   Component Value Date/Time     04/08/2021 09:17 AM    K 4.1 04/08/2021 09:17 AM     04/08/2021 09:17 AM    CO2 25 04/08/2021 09:17 AM    BUN 11 04/08/2021 09:17 AM    CREATININE 0.56 04/08/2021 09:17 AM    GLUCOSE 212 07/18/2021 05:32 PM    GLUCOSE 299 09/26/2020 09:52 AM    PROT 7.1 04/08/2021 09:17 AM    LABALBU 3.9 04/08/2021 09:17 AM    BILITOT 0.46 04/08/2021 09:17 AM    ALKPHOS 63 04/08/2021 09:17 AM    AST 14 04/08/2021 09:17 AM    ALT 22 04/08/2021 09:17 AM     Lab Results   Component Value Date/Time    WBC 13.8 04/08/2021 09:17 AM    RBC 4.76 04/08/2021 09:17 AM    RBC 4.97 09/26/2020 09:52 AM    HGB 14.2 04/08/2021 09:17 AM    HCT 43.0 04/08/2021 09:17 AM    MCV 90.3 04/08/2021 09:17 AM    MCH 29.8 04/08/2021 09:17 AM    MCHC 33.0 04/08/2021 09:17 AM    RDW 12.9 04/08/2021 09:17 AM     04/08/2021 09:17 AM    MPV 9.7 04/08/2021 09:17 AM     No results found for: TSH  Lab Results   Component Value Date/Time    CHOL 144 04/08/2021 09:17 AM    HDL 40 04/08/2021 09:17 AM    LABA1C 13.4 04/18/2022 09:22 AM       Assessment/Plan:      Diagnosis Orders   1. COVID        2. Cough, unspecified type [R05.9 (ICD-10-CM)]          - Peak flow abnormal as noted above  - Patient tachycardic, with frequent coughing fits throughout visit. SPO2 stable 98%  - Rx Paxlovid prescribed.   Printed education regarding medication, uses, side effects printed and suppled to patient  - Prednisone 20 mg twice daily x5 days  - Continue DuoNeb nebulizer every 4 hours PRN. Patient confirms she has this at home  -- OTC medications for symptom relief  - Reviewed CDC guidelines regarding quarantining and masking  - Discussed emergent signs and symptoms and when to seek care at the emergency department  -- Follow-up 1-2 days for reevaluation or sooner with any concerns. Completed Refills   Requested Prescriptions     Signed Prescriptions Disp Refills    nirmatrelvir/ritonavir 300/100 (PAXLOVID, 300/100,) 20 x 150 MG & 10 x 100MG TBPK 30 tablet 0     Sig: Take 3 tablets (two 150 mg nirmatrelvir and one 100 mg ritonavir tablets) by mouth every 12 hours for 5 days. predniSONE (DELTASONE) 20 MG tablet 10 tablet 0     Sig: Take 1 tablet by mouth 2 times daily for 5 days       No orders of the defined types were placed in this encounter. No results found for this visit on 02/08/23. Return if symptoms worsen or fail to improve.     Electronically signed by BRIDGETT Rahman Junior, CNP on 02/09/23 at 7:25 AM.

## 2023-02-10 ENCOUNTER — OFFICE VISIT (OUTPATIENT)
Dept: PRIMARY CARE CLINIC | Age: 38
End: 2023-02-10
Payer: COMMERCIAL

## 2023-02-10 VITALS
HEIGHT: 62 IN | BODY MASS INDEX: 40.67 KG/M2 | WEIGHT: 221 LBS | DIASTOLIC BLOOD PRESSURE: 80 MMHG | TEMPERATURE: 97.8 F | RESPIRATION RATE: 20 BRPM | OXYGEN SATURATION: 96 % | SYSTOLIC BLOOD PRESSURE: 128 MMHG | HEART RATE: 102 BPM

## 2023-02-10 DIAGNOSIS — U07.1 COVID: Primary | ICD-10-CM

## 2023-02-10 PROCEDURE — 99213 OFFICE O/P EST LOW 20 MIN: CPT | Performed by: NURSE PRACTITIONER

## 2023-02-10 RX ORDER — FLUTICASONE PROPIONATE 44 UG/1
2 AEROSOL, METERED RESPIRATORY (INHALATION) 2 TIMES DAILY
Qty: 1 EACH | Refills: 3 | Status: SHIPPED | OUTPATIENT
Start: 2023-02-10

## 2023-02-10 SDOH — ECONOMIC STABILITY: FOOD INSECURITY: WITHIN THE PAST 12 MONTHS, THE FOOD YOU BOUGHT JUST DIDN'T LAST AND YOU DIDN'T HAVE MONEY TO GET MORE.: NEVER TRUE

## 2023-02-10 SDOH — ECONOMIC STABILITY: INCOME INSECURITY: HOW HARD IS IT FOR YOU TO PAY FOR THE VERY BASICS LIKE FOOD, HOUSING, MEDICAL CARE, AND HEATING?: NOT HARD AT ALL

## 2023-02-10 SDOH — ECONOMIC STABILITY: HOUSING INSECURITY
IN THE LAST 12 MONTHS, WAS THERE A TIME WHEN YOU DID NOT HAVE A STEADY PLACE TO SLEEP OR SLEPT IN A SHELTER (INCLUDING NOW)?: NO

## 2023-02-10 SDOH — ECONOMIC STABILITY: FOOD INSECURITY: WITHIN THE PAST 12 MONTHS, YOU WORRIED THAT YOUR FOOD WOULD RUN OUT BEFORE YOU GOT MONEY TO BUY MORE.: NEVER TRUE

## 2023-02-10 ASSESSMENT — ENCOUNTER SYMPTOMS
SHORTNESS OF BREATH: 1
COUGH: 1

## 2023-02-10 NOTE — PROGRESS NOTES
Name: Keila Calderon  : 1985         Chief Complaint:     Chief Complaint   Patient presents with    Post-COVID Symptoms     Patient is still having a cough. But feels slightly better. Still a little light headed. But over all improving. History of Present Illness:      Amanda Real is a 44 y.o.  female who presents with Post-COVID Symptoms (Patient is still having a cough. But feels slightly better. Still a little light headed. But over all improving. )      HPI    The patient presents for COVID follow-up. She was evaluated in office 2023 after testing positive for COVID. On this date, she was prescribed Paxlovid and prednisone 20 mg twice daily x5 days. She was also encouraged to continue her DuoNeb at home every 4 hours as needed. Today, she states things are \"better\". Cough is still present but more sporadic as opposed to constant. She states SOB has been \"not too bad\". She denies wheezing. Her fatigue has improved. Her dizziness has worsened since she started Paxlovid. The chills and sweats are still present but improving. She is continuing duoneb every 4 hours.      Past Medical History:     Past Medical History:   Diagnosis Date    Allergic rhinitis     Anxiety     Asthma     Depression     DM (diabetes mellitus) ([facility] [address]     Hyperlipidemia     Hypertension     Migraines       Reviewed all health maintenance requirements and ordered appropriate tests  Health Maintenance Due   Topic Date Due    HIV screen  Never done    Hepatitis C screen  Never done    Hepatitis B vaccine (1 of 3 - Risk 3-dose series) Never done    DTaP/Tdap/Td vaccine (1 - Tdap) Never done    Cervical cancer screen  Never done    COVID-19 Vaccine (3 - Booster for Reginaldo series) 2021    Diabetic Alb to Cr ratio (uACR) test  2022    Lipids  2022    GFR test (Diabetes, CKD 3-4, OR last GFR 15-59)  2022    Diabetic retinal exam  2022    A1C test (Diabetic or Prediabetic)  2022    Flu vaccine (1) 08/01/2022       Past Surgical History:     Past Surgical History:   Procedure Laterality Date    CHOLECYSTECTOMY  2007    [facility]    KNEE ARTHROSCOPY  2001    right knee    TONSILLECTOMY  1988        Medications:       Prior to Admission medications    Medication Sig Start Date End Date Taking? Authorizing Provider   fluticasone (FLOVENT HFA) 44 MCG/ACT inhaler Inhale 2 puffs into the lungs 2 times daily 2/10/23  Yes BRIDGETT Bae CNP   nirmatrelvir/ritonavir 300/100 (PAXLOVID, 300/100,) 20 x 150 MG & 10 x 100MG TBPK Take 3 tablets (two 150 mg nirmatrelvir and one 100 mg ritonavir tablets) by mouth every 12 hours for 5 days. 2/8/23 2/13/23 Yes BRIDGETT Bae CNP   predniSONE (DELTASONE) 20 MG tablet Take 1 tablet by mouth 2 times daily for 5 days 2/8/23 2/13/23 Yes BRIDGETT Bae CNP   escitalopram (LEXAPRO) 20 MG tablet Take 1 tablet by mouth daily 10/31/22  Yes BRIDGETT Bae CNP   pioglitazone (ACTOS) 30 MG tablet TAKE 1 TABLET BY MOUTH EVERY DAY 10/19/22  Yes BRIDGETT Bae CNP   ipratropium-albuterol (DUONEB) 0.5-2.5 (3) MG/3ML SOLN nebulizer solution Inhale 3 mLs into the lungs every 4 hours as needed for Shortness of Breath or Other (wheezing) 10/19/22  Yes BRIDGETT Bae CNP   buPROPion (WELLBUTRIN XL) 150 MG extended release tablet Take 1 tablet by mouth every morning 10/7/22  Yes BRIDGETT Bae CNP   glimepiride (AMARYL) 2 MG tablet TAKE 1 TABLET BY MOUTH TWICE A DAY WITH MEALS 7/7/22  Yes BRIDGETT Bae CNP   Insulin Pen Needle ([employer] PEN NEEDLES 31G) 31G X 8 MM MISC 1 each by Does not apply route daily 4/25/22  Yes BRIDGETT Bae CNP   insulin detemir (LEVEMIR FLEXTOUCH) 100 UNIT/ML injection pen Inject 10 Units into the skin nightly 4/18/22  Yes BRIDGETT Bae CNP   blood glucose monitor strips Test 3 times a day & as needed for symptoms of irregular blood glucose.  Dispense sufficient amount for indicated testing frequency plus additional to accommodate PRN testing needs. 4/18/22  Yes BRIDGETT Boss CNP   atorvastatin (LIPITOR) 10 MG tablet TAKE 1 TABLET BY MOUTH EVERY DAY 1/31/22  Yes BRIDGETT Boss CNP   glucose monitoring kit (FREESTYLE) monitoring kit 1 kit by Does not apply route daily 1/6/21  Yes BRIDGETT Boss CNP   lisinopril (PRINIVIL;ZESTRIL) 5 MG tablet Take 5 mg by mouth daily 1/21/20  Yes Historical Provider, MD   levonorgestrel (MIRENA) IUD 52 mg 1 each by Intrauterine route once   Yes Historical Provider, MD   albuterol sulfate HFA (PROAIR HFA) 108 (90 Base) MCG/ACT inhaler Inhale 2 puffs into the lungs every 6 hours as needed for Wheezing or Shortness of Breath 8/12/20  Yes BRIDGETT Boss CNP        Allergies:       Metformin, Avocado, Banana, Codeine, Cucumber extract, Food, Nut [peanut-containing drug products], Other, Seasonal, and Percocet [oxycodone-acetaminophen]    Social History:     Tobacco:    reports that she has never smoked. She has never used smokeless tobacco.  Alcohol:      reports current alcohol use. Drug Use:  reports no history of drug use. Family History:     Family History   Problem Relation Age of Onset    Stroke Father     Heart Disease Father     Diabetes Father     High Blood Pressure Father     Diabetes type 2  Brother     Heart Attack Maternal Grandfather        Review of Systems:     Positive and Negative as described in HPI    Review of Systems   Constitutional:  Positive for chills and fatigue. Respiratory:  Positive for cough and shortness of breath. Neurological:  Positive for dizziness. Physical Exam:   Vitals:  /80   Pulse (!) 102   Temp 97.8 °F (36.6 °C)   Resp 20   Ht 5' 2\" (1.575 m)   Wt 221 lb (100.2 kg)   SpO2 96%    L/min Comment: 417  BMI 40.42 kg/m²     Physical Exam  Constitutional:       General: She is not in acute distress. Appearance: Normal appearance. She is obese. She is not ill-appearing or toxic-appearing.   HENT:      Right Ear: Tympanic membrane, ear canal and external ear normal. There is no impacted cerumen.      Left Ear: Tympanic membrane, ear canal and external ear normal. There is no impacted cerumen.      Nose: Nose normal. No congestion or rhinorrhea.      Mouth/Throat:      Mouth: Mucous membranes are moist.      Pharynx: No oropharyngeal exudate or posterior oropharyngeal erythema.   Cardiovascular:      Rate and Rhythm: Regular rhythm. Tachycardia present.      Heart sounds: Normal heart sounds.   Pulmonary:      Effort: Pulmonary effort is normal. No respiratory distress.      Breath sounds: Normal breath sounds. No stridor. No wheezing, rhonchi or rales.   Lymphadenopathy:      Cervical: No cervical adenopathy.   Neurological:      Mental Status: She is alert and oriented to person, place, and time.   Psychiatric:         Mood and Affect: Mood normal.         Behavior: Behavior normal.         Thought Content: Thought content normal.         Judgment: Judgment normal.       Data:     Lab Results   Component Value Date/Time     04/08/2021 09:17 AM    K 4.1 04/08/2021 09:17 AM     04/08/2021 09:17 AM    CO2 25 04/08/2021 09:17 AM    BUN 11 04/08/2021 09:17 AM    CREATININE 0.56 04/08/2021 09:17 AM    GLUCOSE 212 07/18/2021 05:32 PM    GLUCOSE 299 09/26/2020 09:52 AM    PROT 7.1 04/08/2021 09:17 AM    LABALBU 3.9 04/08/2021 09:17 AM    BILITOT 0.46 04/08/2021 09:17 AM    ALKPHOS 63 04/08/2021 09:17 AM    AST 14 04/08/2021 09:17 AM    ALT 22 04/08/2021 09:17 AM     Lab Results   Component Value Date/Time    WBC 13.8 04/08/2021 09:17 AM    RBC 4.76 04/08/2021 09:17 AM    RBC 4.97 09/26/2020 09:52 AM    HGB 14.2 04/08/2021 09:17 AM    HCT 43.0 04/08/2021 09:17 AM    MCV 90.3 04/08/2021 09:17 AM    MCH 29.8 04/08/2021 09:17 AM    MCHC 33.0 04/08/2021 09:17 AM    RDW 12.9 04/08/2021 09:17 AM     04/08/2021 09:17 AM    MPV 9.7 04/08/2021 09:17 AM  No results found for: TSH  Lab Results   Component Value Date/Time    CHOL 144 04/08/2021 09:17 AM    HDL 40 04/08/2021 09:17 AM    LABA1C 13.4 04/18/2022 09:22 AM       Assessment/Plan:      Diagnosis Orders   1. COVID          - Symptoms, vital signs, peak flow, and lung sounds improved when compared to 2 days ago   - Continue prednisone 20mg BID, duoneb, and paxlovid   - Add flovent BID   - Encourage rest and hydration   - Reviewed worsening s/s and when to notify office or seek immediate care     Completed Refills   Requested Prescriptions     Signed Prescriptions Disp Refills    fluticasone (FLOVENT HFA) 44 MCG/ACT inhaler 1 each 3     Sig: Inhale 2 puffs into the lungs 2 times daily       No orders of the defined types were placed in this encounter. No results found for this visit on 02/10/23. Return if symptoms worsen or fail to improve.     Electronically signed by BRIDGETT Tinajero CNP on 02/10/23 at 11:30 AM.

## 2023-02-10 NOTE — PATIENT INSTRUCTIONS
SURVEY:    You may be receiving a survey from [employer] regarding your visit today. Please complete the survey to enable us to provide the highest quality of care to you and your family. If you cannot score us a very good on any question, please call the office to discuss how we could of made your experience a very good one. Thank you.

## 2023-04-02 ENCOUNTER — HOSPITAL ENCOUNTER (OUTPATIENT)
Age: 38
Setting detail: OBSERVATION
Discharge: HOME | End: 2023-04-03
Attending: EMERGENCY MEDICINE | Admitting: STUDENT IN AN ORGANIZED HEALTH CARE EDUCATION/TRAINING PROGRAM
Payer: COMMERCIAL

## 2023-04-02 ENCOUNTER — APPOINTMENT (OUTPATIENT)
Dept: GENERAL RADIOLOGY | Age: 38
End: 2023-04-02
Payer: COMMERCIAL

## 2023-04-02 DIAGNOSIS — R00.0 SINUS TACHYCARDIA: ICD-10-CM

## 2023-04-02 DIAGNOSIS — J45.51 SEVERE PERSISTENT ASTHMA WITH EXACERBATION: Primary | ICD-10-CM

## 2023-04-02 DIAGNOSIS — R73.9 HYPERGLYCEMIA: ICD-10-CM

## 2023-04-02 PROBLEM — R05.8 RECURRENT COUGH: Status: ACTIVE | Noted: 2023-04-02

## 2023-04-02 LAB
ABSOLUTE EOS #: 0.15 K/UL (ref 0–0.44)
ABSOLUTE IMMATURE GRANULOCYTE: 0.1 K/UL (ref 0–0.3)
ABSOLUTE LYMPH #: 4.97 K/UL (ref 1.1–3.7)
ABSOLUTE MONO #: 1.17 K/UL (ref 0.1–1.2)
ALBUMIN SERPL-MCNC: 3.9 G/DL (ref 3.5–5.2)
ALBUMIN/GLOBULIN RATIO: 1.3 (ref 1–2.5)
ALP SERPL-CCNC: 68 U/L (ref 35–104)
ALT SERPL-CCNC: 18 U/L (ref 5–33)
AMPHETAMINE SCREEN URINE: NEGATIVE
ANION GAP SERPL CALCULATED.3IONS-SCNC: 15 MMOL/L (ref 9–17)
AST SERPL-CCNC: 12 U/L
BARBITURATE SCREEN URINE: NEGATIVE
BASOPHILS # BLD: 1 % (ref 0–2)
BASOPHILS ABSOLUTE: 0.13 K/UL (ref 0–0.2)
BENZODIAZEPINE SCREEN, URINE: NEGATIVE
BILIRUB SERPL-MCNC: 0.3 MG/DL (ref 0.3–1.2)
BILIRUBIN URINE: NEGATIVE
BNP SERPL-MCNC: <36 PG/ML
BUN SERPL-MCNC: 10 MG/DL (ref 6–20)
BUN/CREAT BLD: 18 (ref 9–20)
BUPRENORPHINE URINE: NEGATIVE
CALCIUM SERPL-MCNC: 9.5 MG/DL (ref 8.6–10.4)
CANNABINOID SCREEN URINE: NEGATIVE
CHLORIDE SERPL-SCNC: 95 MMOL/L (ref 98–107)
CO2 SERPL-SCNC: 25 MMOL/L (ref 20–31)
COCAINE METABOLITE, URINE: NEGATIVE
COLOR: YELLOW
CREAT SERPL-MCNC: 0.55 MG/DL (ref 0.5–0.9)
D DIMER BLD IA.RAPID-MCNC: 0.27 MG/L FEU (ref 0–0.59)
EKG ATRIAL RATE: 121 BPM
EKG P AXIS: 63 DEGREES
EKG P-R INTERVAL: 144 MS
EKG Q-T INTERVAL: 316 MS
EKG QRS DURATION: 74 MS
EKG QTC CALCULATION (BAZETT): 448 MS
EKG R AXIS: 14 DEGREES
EKG T AXIS: 51 DEGREES
EKG VENTRICULAR RATE: 121 BPM
EOSINOPHILS RELATIVE PERCENT: 1 % (ref 1–4)
EPITHELIAL CELLS UA: NORMAL /HPF (ref 0–25)
FENTANYL URINE: NEGATIVE
GFR SERPL CREATININE-BSD FRML MDRD: >60 ML/MIN/1.73M2
GLUCOSE BLD-MCNC: 421 MG/DL (ref 74–100)
GLUCOSE SERPL-MCNC: 385 MG/DL (ref 70–99)
GLUCOSE UR STRIP.AUTO-MCNC: ABNORMAL MG/DL
HCG QUALITATIVE: NEGATIVE
HCO3 VENOUS: 24 MMOL/L (ref 24–30)
HCT VFR BLD AUTO: 40.5 % (ref 36.3–47.1)
HGB BLD-MCNC: 14 G/DL (ref 11.9–15.1)
IMMATURE GRANULOCYTES: 1 %
KETONES UR STRIP.AUTO-MCNC: ABNORMAL MG/DL
LEUKOCYTE ESTERASE UR QL STRIP.AUTO: NEGATIVE
LYMPHOCYTES # BLD: 30 % (ref 24–43)
MAGNESIUM SERPL-MCNC: 1.7 MG/DL (ref 1.6–2.6)
MCH RBC QN AUTO: 30.2 PG (ref 25.2–33.5)
MCHC RBC AUTO-ENTMCNC: 34.6 G/DL (ref 28.4–34.8)
MCV RBC AUTO: 87.3 FL (ref 82.6–102.9)
METHADONE SCREEN, URINE: NEGATIVE
MONOCYTES # BLD: 7 % (ref 3–12)
NITRITE UR QL STRIP.AUTO: NEGATIVE
NRBC AUTOMATED: 0 PER 100 WBC
O2 SAT, VEN: 88.3 % (ref 60–85)
OPIATES, URINE: NEGATIVE
OXYCODONE SCREEN URINE: NEGATIVE
PCO2, VEN: 41 MM HG (ref 39–55)
PDW BLD-RTO: 12.9 % (ref 11.8–14.4)
PH VENOUS: 7.4 (ref 7.32–7.42)
PHENCYCLIDINE, URINE: NEGATIVE
PLATELET # BLD AUTO: 429 K/UL (ref 138–453)
PMV BLD AUTO: 10.1 FL (ref 8.1–13.5)
PO2, VEN: 54.3 MM HG (ref 30–50)
POSITIVE BASE EXCESS, VEN: 0.2 MMOL/L (ref 0–2)
POTASSIUM SERPL-SCNC: 3.9 MMOL/L (ref 3.7–5.3)
PROT SERPL-MCNC: 7 G/DL (ref 6.4–8.3)
PROT UR STRIP.AUTO-MCNC: 6 MG/DL (ref 5–9)
PROT UR STRIP.AUTO-MCNC: NEGATIVE MG/DL
RBC # BLD: 4.64 M/UL (ref 3.95–5.11)
RBC CLUMPS #/AREA URNS AUTO: NORMAL /HPF (ref 0–2)
SARS-COV-2 RDRP RESP QL NAA+PROBE: NOT DETECTED
SEG NEUTROPHILS: 60 % (ref 36–65)
SEGMENTED NEUTROPHILS ABSOLUTE COUNT: 9.91 K/UL (ref 1.5–8.1)
SODIUM SERPL-SCNC: 135 MMOL/L (ref 135–144)
SPECIFIC GRAVITY UA: 1.01 (ref 1.01–1.02)
SPECIMEN DESCRIPTION: NORMAL
TROPONIN I SERPL DL<=0.01 NG/ML-MCNC: 6 NG/L (ref 0–14)
TSH SERPL-ACNC: 3.89 UIU/ML (ref 0.3–5)
TURBIDITY: CLEAR
URINE HGB: NEGATIVE
UROBILINOGEN, URINE: NORMAL
WBC # BLD AUTO: 16.4 K/UL (ref 3.5–11.3)
WBC UA: NORMAL /HPF (ref 0–5)

## 2023-04-02 PROCEDURE — 96366 THER/PROPH/DIAG IV INF ADDON: CPT

## 2023-04-02 PROCEDURE — 87635 SARS-COV-2 COVID-19 AMP PRB: CPT

## 2023-04-02 PROCEDURE — 93005 ELECTROCARDIOGRAM TRACING: CPT | Performed by: EMERGENCY MEDICINE

## 2023-04-02 PROCEDURE — 82805 BLOOD GASES W/O2 SATURATION: CPT

## 2023-04-02 PROCEDURE — 6360000002 HC RX W HCPCS: Performed by: STUDENT IN AN ORGANIZED HEALTH CARE EDUCATION/TRAINING PROGRAM

## 2023-04-02 PROCEDURE — 84703 CHORIONIC GONADOTROPIN ASSAY: CPT

## 2023-04-02 PROCEDURE — G0378 HOSPITAL OBSERVATION PER HR: HCPCS

## 2023-04-02 PROCEDURE — 93010 ELECTROCARDIOGRAM REPORT: CPT | Performed by: INTERNAL MEDICINE

## 2023-04-02 PROCEDURE — 94761 N-INVAS EAR/PLS OXIMETRY MLT: CPT

## 2023-04-02 PROCEDURE — 96361 HYDRATE IV INFUSION ADD-ON: CPT

## 2023-04-02 PROCEDURE — 6370000000 HC RX 637 (ALT 250 FOR IP): Performed by: EMERGENCY MEDICINE

## 2023-04-02 PROCEDURE — 83880 ASSAY OF NATRIURETIC PEPTIDE: CPT

## 2023-04-02 PROCEDURE — 85379 FIBRIN DEGRADATION QUANT: CPT

## 2023-04-02 PROCEDURE — 99285 EMERGENCY DEPT VISIT HI MDM: CPT

## 2023-04-02 PROCEDURE — 84484 ASSAY OF TROPONIN QUANT: CPT

## 2023-04-02 PROCEDURE — 84443 ASSAY THYROID STIM HORMONE: CPT

## 2023-04-02 PROCEDURE — 6360000002 HC RX W HCPCS: Performed by: EMERGENCY MEDICINE

## 2023-04-02 PROCEDURE — 83735 ASSAY OF MAGNESIUM: CPT

## 2023-04-02 PROCEDURE — 2580000003 HC RX 258: Performed by: EMERGENCY MEDICINE

## 2023-04-02 PROCEDURE — 82947 ASSAY GLUCOSE BLOOD QUANT: CPT

## 2023-04-02 PROCEDURE — 96374 THER/PROPH/DIAG INJ IV PUSH: CPT

## 2023-04-02 PROCEDURE — 96365 THER/PROPH/DIAG IV INF INIT: CPT

## 2023-04-02 PROCEDURE — 71045 X-RAY EXAM CHEST 1 VIEW: CPT

## 2023-04-02 PROCEDURE — 6370000000 HC RX 637 (ALT 250 FOR IP): Performed by: STUDENT IN AN ORGANIZED HEALTH CARE EDUCATION/TRAINING PROGRAM

## 2023-04-02 PROCEDURE — 81001 URINALYSIS AUTO W/SCOPE: CPT

## 2023-04-02 PROCEDURE — 96375 TX/PRO/DX INJ NEW DRUG ADDON: CPT

## 2023-04-02 PROCEDURE — 96372 THER/PROPH/DIAG INJ SC/IM: CPT

## 2023-04-02 PROCEDURE — 94640 AIRWAY INHALATION TREATMENT: CPT

## 2023-04-02 PROCEDURE — 36415 COLL VENOUS BLD VENIPUNCTURE: CPT

## 2023-04-02 PROCEDURE — 2580000003 HC RX 258: Performed by: STUDENT IN AN ORGANIZED HEALTH CARE EDUCATION/TRAINING PROGRAM

## 2023-04-02 PROCEDURE — 94664 DEMO&/EVAL PT USE INHALER: CPT

## 2023-04-02 PROCEDURE — 82306 VITAMIN D 25 HYDROXY: CPT

## 2023-04-02 PROCEDURE — 83036 HEMOGLOBIN GLYCOSYLATED A1C: CPT

## 2023-04-02 PROCEDURE — 85025 COMPLETE CBC W/AUTO DIFF WBC: CPT

## 2023-04-02 PROCEDURE — 80053 COMPREHEN METABOLIC PANEL: CPT

## 2023-04-02 PROCEDURE — 80307 DRUG TEST PRSMV CHEM ANLYZR: CPT

## 2023-04-02 RX ORDER — ONDANSETRON 2 MG/ML
4 INJECTION INTRAMUSCULAR; INTRAVENOUS EVERY 6 HOURS PRN
Status: DISCONTINUED | OUTPATIENT
Start: 2023-04-02 | End: 2023-04-03 | Stop reason: HOSPADM

## 2023-04-02 RX ORDER — ATORVASTATIN CALCIUM 10 MG/1
10 TABLET, FILM COATED ORAL DAILY
Status: DISCONTINUED | OUTPATIENT
Start: 2023-04-02 | End: 2023-04-03 | Stop reason: HOSPADM

## 2023-04-02 RX ORDER — IPRATROPIUM BROMIDE AND ALBUTEROL SULFATE 2.5; .5 MG/3ML; MG/3ML
1 SOLUTION RESPIRATORY (INHALATION)
Status: DISCONTINUED | OUTPATIENT
Start: 2023-04-02 | End: 2023-04-03 | Stop reason: HOSPADM

## 2023-04-02 RX ORDER — ALBUTEROL SULFATE 2.5 MG/3ML
2.5 SOLUTION RESPIRATORY (INHALATION)
Status: DISCONTINUED | OUTPATIENT
Start: 2023-04-02 | End: 2023-04-03 | Stop reason: HOSPADM

## 2023-04-02 RX ORDER — INSULIN GLARGINE 100 [IU]/ML
20 INJECTION, SOLUTION SUBCUTANEOUS NIGHTLY
Status: DISCONTINUED | OUTPATIENT
Start: 2023-04-03 | End: 2023-04-03

## 2023-04-02 RX ORDER — METHYLPREDNISOLONE SODIUM SUCCINATE 40 MG/ML
40 INJECTION, POWDER, LYOPHILIZED, FOR SOLUTION INTRAMUSCULAR; INTRAVENOUS EVERY 6 HOURS
Status: DISCONTINUED | OUTPATIENT
Start: 2023-04-02 | End: 2023-04-03 | Stop reason: HOSPADM

## 2023-04-02 RX ORDER — ACETAMINOPHEN 650 MG/1
650 SUPPOSITORY RECTAL EVERY 6 HOURS PRN
Status: DISCONTINUED | OUTPATIENT
Start: 2023-04-02 | End: 2023-04-03 | Stop reason: HOSPADM

## 2023-04-02 RX ORDER — BENZONATATE 100 MG/1
200 CAPSULE ORAL 3 TIMES DAILY
Status: DISCONTINUED | OUTPATIENT
Start: 2023-04-02 | End: 2023-04-03 | Stop reason: HOSPADM

## 2023-04-02 RX ORDER — ESCITALOPRAM OXALATE 5 MG/1
20 TABLET ORAL DAILY
Status: DISCONTINUED | OUTPATIENT
Start: 2023-04-02 | End: 2023-04-03 | Stop reason: HOSPADM

## 2023-04-02 RX ORDER — ENOXAPARIN SODIUM 100 MG/ML
40 INJECTION SUBCUTANEOUS DAILY
Status: DISCONTINUED | OUTPATIENT
Start: 2023-04-02 | End: 2023-04-03

## 2023-04-02 RX ORDER — GLIPIZIDE 5 MG/1
5 TABLET ORAL
Status: DISCONTINUED | OUTPATIENT
Start: 2023-04-03 | End: 2023-04-03 | Stop reason: HOSPADM

## 2023-04-02 RX ORDER — LISINOPRIL 5 MG/1
5 TABLET ORAL DAILY
Status: DISCONTINUED | OUTPATIENT
Start: 2023-04-02 | End: 2023-04-03 | Stop reason: HOSPADM

## 2023-04-02 RX ORDER — LEVALBUTEROL INHALATION SOLUTION 0.63 MG/3ML
0.63 SOLUTION RESPIRATORY (INHALATION) EVERY 4 HOURS PRN
Status: DISCONTINUED | OUTPATIENT
Start: 2023-04-02 | End: 2023-04-03 | Stop reason: HOSPADM

## 2023-04-02 RX ORDER — 0.9 % SODIUM CHLORIDE 0.9 %
1000 INTRAVENOUS SOLUTION INTRAVENOUS ONCE
Status: COMPLETED | OUTPATIENT
Start: 2023-04-02 | End: 2023-04-02

## 2023-04-02 RX ORDER — SODIUM CHLORIDE 9 MG/ML
INJECTION, SOLUTION INTRAVENOUS CONTINUOUS
Status: DISCONTINUED | OUTPATIENT
Start: 2023-04-02 | End: 2023-04-03

## 2023-04-02 RX ORDER — PIOGLITAZONEHYDROCHLORIDE 15 MG/1
30 TABLET ORAL DAILY
Status: DISCONTINUED | OUTPATIENT
Start: 2023-04-02 | End: 2023-04-03 | Stop reason: HOSPADM

## 2023-04-02 RX ORDER — INSULIN GLARGINE 100 [IU]/ML
10 INJECTION, SOLUTION SUBCUTANEOUS ONCE
Status: COMPLETED | OUTPATIENT
Start: 2023-04-02 | End: 2023-04-02

## 2023-04-02 RX ORDER — METHYLPREDNISOLONE SODIUM SUCCINATE 125 MG/2ML
125 INJECTION, POWDER, LYOPHILIZED, FOR SOLUTION INTRAMUSCULAR; INTRAVENOUS ONCE
Status: COMPLETED | OUTPATIENT
Start: 2023-04-02 | End: 2023-04-02

## 2023-04-02 RX ORDER — IPRATROPIUM BROMIDE AND ALBUTEROL SULFATE 2.5; .5 MG/3ML; MG/3ML
1 SOLUTION RESPIRATORY (INHALATION) ONCE
Status: COMPLETED | OUTPATIENT
Start: 2023-04-02 | End: 2023-04-02

## 2023-04-02 RX ORDER — ALBUTEROL SULFATE 90 UG/1
2 AEROSOL, METERED RESPIRATORY (INHALATION) EVERY 6 HOURS PRN
Status: DISCONTINUED | OUTPATIENT
Start: 2023-04-02 | End: 2023-04-03 | Stop reason: HOSPADM

## 2023-04-02 RX ORDER — BUPROPION HYDROCHLORIDE 150 MG/1
150 TABLET ORAL EVERY MORNING
Status: DISCONTINUED | OUTPATIENT
Start: 2023-04-03 | End: 2023-04-03 | Stop reason: HOSPADM

## 2023-04-02 RX ORDER — MAGNESIUM SULFATE 1 G/100ML
1000 INJECTION INTRAVENOUS PRN
Status: DISCONTINUED | OUTPATIENT
Start: 2023-04-02 | End: 2023-04-03 | Stop reason: HOSPADM

## 2023-04-02 RX ORDER — FAMOTIDINE 20 MG/1
20 TABLET, FILM COATED ORAL 2 TIMES DAILY
Status: DISCONTINUED | OUTPATIENT
Start: 2023-04-02 | End: 2023-04-03 | Stop reason: HOSPADM

## 2023-04-02 RX ORDER — SODIUM CHLORIDE 0.9 % (FLUSH) 0.9 %
5-40 SYRINGE (ML) INJECTION EVERY 12 HOURS SCHEDULED
Status: DISCONTINUED | OUTPATIENT
Start: 2023-04-02 | End: 2023-04-03 | Stop reason: HOSPADM

## 2023-04-02 RX ORDER — SODIUM CHLORIDE 0.9 % (FLUSH) 0.9 %
5-40 SYRINGE (ML) INJECTION PRN
Status: DISCONTINUED | OUTPATIENT
Start: 2023-04-02 | End: 2023-04-03 | Stop reason: HOSPADM

## 2023-04-02 RX ORDER — DEXTROSE MONOHYDRATE 100 MG/ML
INJECTION, SOLUTION INTRAVENOUS CONTINUOUS PRN
Status: DISCONTINUED | OUTPATIENT
Start: 2023-04-02 | End: 2023-04-03 | Stop reason: HOSPADM

## 2023-04-02 RX ORDER — GLUCAGON 1 MG/ML
1 KIT INJECTION PRN
Status: DISCONTINUED | OUTPATIENT
Start: 2023-04-02 | End: 2023-04-03 | Stop reason: HOSPADM

## 2023-04-02 RX ORDER — POLYETHYLENE GLYCOL 3350 17 G/17G
17 POWDER, FOR SOLUTION ORAL DAILY PRN
Status: DISCONTINUED | OUTPATIENT
Start: 2023-04-02 | End: 2023-04-03 | Stop reason: HOSPADM

## 2023-04-02 RX ORDER — INSULIN LISPRO 100 [IU]/ML
0-16 INJECTION, SOLUTION INTRAVENOUS; SUBCUTANEOUS
Status: DISCONTINUED | OUTPATIENT
Start: 2023-04-02 | End: 2023-04-03 | Stop reason: HOSPADM

## 2023-04-02 RX ORDER — INSULIN LISPRO 100 [IU]/ML
0-4 INJECTION, SOLUTION INTRAVENOUS; SUBCUTANEOUS NIGHTLY
Status: DISCONTINUED | OUTPATIENT
Start: 2023-04-02 | End: 2023-04-03 | Stop reason: HOSPADM

## 2023-04-02 RX ORDER — PROMETHAZINE HYDROCHLORIDE 25 MG/1
12.5 TABLET ORAL EVERY 6 HOURS PRN
Status: DISCONTINUED | OUTPATIENT
Start: 2023-04-02 | End: 2023-04-03 | Stop reason: HOSPADM

## 2023-04-02 RX ORDER — LORAZEPAM 0.5 MG/1
0.5 TABLET ORAL NIGHTLY PRN
Status: DISCONTINUED | OUTPATIENT
Start: 2023-04-02 | End: 2023-04-03 | Stop reason: HOSPADM

## 2023-04-02 RX ORDER — ACETAMINOPHEN 325 MG/1
650 TABLET ORAL EVERY 6 HOURS PRN
Status: DISCONTINUED | OUTPATIENT
Start: 2023-04-02 | End: 2023-04-03 | Stop reason: HOSPADM

## 2023-04-02 RX ORDER — PREDNISONE 20 MG/1
40 TABLET ORAL DAILY
Status: DISCONTINUED | OUTPATIENT
Start: 2023-04-05 | End: 2023-04-03 | Stop reason: HOSPADM

## 2023-04-02 RX ORDER — INSULIN GLARGINE 100 [IU]/ML
10 INJECTION, SOLUTION SUBCUTANEOUS NIGHTLY
Status: DISCONTINUED | OUTPATIENT
Start: 2023-04-02 | End: 2023-04-02

## 2023-04-02 RX ORDER — SODIUM CHLORIDE 9 MG/ML
25 INJECTION, SOLUTION INTRAVENOUS PRN
Status: DISCONTINUED | OUTPATIENT
Start: 2023-04-02 | End: 2023-04-03 | Stop reason: HOSPADM

## 2023-04-02 RX ADMIN — SODIUM CHLORIDE 1000 ML: 9 INJECTION, SOLUTION INTRAVENOUS at 17:07

## 2023-04-02 RX ADMIN — INSULIN GLARGINE 10 UNITS: 100 INJECTION, SOLUTION SUBCUTANEOUS at 20:27

## 2023-04-02 RX ADMIN — IPRATROPIUM BROMIDE AND ALBUTEROL SULFATE 1 AMPULE: 2.5; .5 SOLUTION RESPIRATORY (INHALATION) at 17:26

## 2023-04-02 RX ADMIN — INSULIN LISPRO 4 UNITS: 100 INJECTION, SOLUTION INTRAVENOUS; SUBCUTANEOUS at 20:29

## 2023-04-02 RX ADMIN — INSULIN LISPRO 16 UNITS: 100 INJECTION, SOLUTION INTRAVENOUS; SUBCUTANEOUS at 20:28

## 2023-04-02 RX ADMIN — INSULIN GLARGINE 10 UNITS: 100 INJECTION, SOLUTION SUBCUTANEOUS at 20:56

## 2023-04-02 RX ADMIN — ENOXAPARIN SODIUM 40 MG: 100 INJECTION SUBCUTANEOUS at 20:02

## 2023-04-02 RX ADMIN — IPRATROPIUM BROMIDE AND ALBUTEROL SULFATE 1 AMPULE: 2.5; .5 SOLUTION RESPIRATORY (INHALATION) at 20:36

## 2023-04-02 RX ADMIN — METHYLPREDNISOLONE SODIUM SUCCINATE 125 MG: 125 INJECTION, POWDER, FOR SOLUTION INTRAMUSCULAR; INTRAVENOUS at 17:09

## 2023-04-02 RX ADMIN — MAGNESIUM SULFATE HEPTAHYDRATE 1000 MG: 1 INJECTION, SOLUTION INTRAVENOUS at 21:45

## 2023-04-02 RX ADMIN — FAMOTIDINE 20 MG: 20 TABLET, FILM COATED ORAL at 20:03

## 2023-04-02 RX ADMIN — SODIUM CHLORIDE: 9 INJECTION, SOLUTION INTRAVENOUS at 20:05

## 2023-04-02 RX ADMIN — MAGNESIUM SULFATE HEPTAHYDRATE 1000 MG: 1 INJECTION, SOLUTION INTRAVENOUS at 20:08

## 2023-04-02 RX ADMIN — LORAZEPAM 0.5 MG: 0.5 TABLET ORAL at 21:40

## 2023-04-02 ASSESSMENT — PAIN - FUNCTIONAL ASSESSMENT: PAIN_FUNCTIONAL_ASSESSMENT: 0-10

## 2023-04-02 NOTE — ED PROVIDER NOTES
04/02/23 1754 04/02/23 1800   BP: 124/64   (!) 140/50   Pulse:  (!) 129 (!) 134 (!) 134   Resp:       Temp:       TempSrc:       SpO2: 99% 95% 96% 96%       1)  Number and Complexity of Problems    Chief Complaint--Problem List This Visit: Difficulty breathing cough        HPI 79-year-old patient with known asthmatic known diabetic presents to the emergency department with history for persistent cough shortness of breath for 2 weeks in duration. The patient was most recently evaluated urgent care center approxi-1 week prior during which time she did receive steroids. She has been utilizing her inhalers as well as her air aerosol machines at home. Patient does have a history for positive COVID 1 months prior. She has had no syncopal episodes. No complaints of chest pain. No fever no chills    Pertinent past medical history     Differential Diagnosis: Pulmonary embolism, pneumothorax, congestive heart failure, exacerbation asthma.     Diagnoses Considered but Do Not Suspect:      Pertinent Comorbid Conditions: Asthma, diabetes mellitus    2) Focused physical examination   -----------------------------------------------    General patient is alert obvious respiratory distress    Vital signs blood pressure 132/91-temperature 99-respiratory rate is 18-pulse oximeter 96%-heart rate is 130    HEENT no photophobia no audible wheezing no airway compromise    Neck trachea is midline    Lungs diminished air entry bilaterally with prolongation expiratory phase over the respiratory phase    Cardiovascular regular rhythm rhythm tachycardic rate approximately 130s    Abdomen without tenderness    Extremities without edema    Neurologic patient is alert oriented no focal logic deficits are appreciated    3)  Data Reviewed  My EKG interpretation: Performed at 1718-ventricular rate is 121 sinus tachycardia-NC interval 0.144-QRS duration 0.074-QTc corrected 0.448 there is poor R wave progression V1 to V2 interpreted by

## 2023-04-02 NOTE — ED NOTES
Pt ambulated around department at this time. Pulse went to 142 and oxygen saturation was 94%. Pt denied SOB while ambulating, however upon returning to room began coughing profusely. Pt maintained oxygen saturation, however remains tachy at rest. Provider made aware. Pt denies any needs at this time.      Erik Mchugh RN  04/02/23 [address], RN  04/02/23 1122

## 2023-04-03 ENCOUNTER — APPOINTMENT (OUTPATIENT)
Dept: CT IMAGING | Age: 38
End: 2023-04-03
Payer: COMMERCIAL

## 2023-04-03 ENCOUNTER — APPOINTMENT (OUTPATIENT)
Dept: NON INVASIVE DIAGNOSTICS | Age: 38
End: 2023-04-03
Payer: COMMERCIAL

## 2023-04-03 VITALS
RESPIRATION RATE: 16 BRPM | SYSTOLIC BLOOD PRESSURE: 122 MMHG | HEIGHT: 63 IN | BODY MASS INDEX: 40.93 KG/M2 | TEMPERATURE: 98 F | OXYGEN SATURATION: 96 % | HEART RATE: 108 BPM | WEIGHT: 231 LBS | DIASTOLIC BLOOD PRESSURE: 76 MMHG

## 2023-04-03 DIAGNOSIS — R00.0 TACHYCARDIA: Primary | ICD-10-CM

## 2023-04-03 PROBLEM — F43.10 PTSD (POST-TRAUMATIC STRESS DISORDER): Status: ACTIVE | Noted: 2023-04-03

## 2023-04-03 LAB
ABSOLUTE EOS #: 0 K/UL (ref 0–0.44)
ABSOLUTE IMMATURE GRANULOCYTE: 0 K/UL (ref 0–0.3)
ABSOLUTE LYMPH #: 2.49 K/UL (ref 1.1–3.7)
ABSOLUTE MONO #: 0 K/UL (ref 0.1–1.2)
ADENOVIRUS PCR: NOT DETECTED
ALBUMIN SERPL-MCNC: 3.7 G/DL (ref 3.5–5.2)
ALBUMIN/GLOBULIN RATIO: 1.2 (ref 1–2.5)
ALP SERPL-CCNC: 66 U/L (ref 35–104)
ALT SERPL-CCNC: 14 U/L (ref 5–33)
ANION GAP SERPL CALCULATED.3IONS-SCNC: 13 MMOL/L (ref 9–17)
AST SERPL-CCNC: 10 U/L
B PARAP IS1001 DNA NPH QL NAA+NON-PROBE: NOT DETECTED
B PERT DNA SPEC QL NAA+PROBE: NOT DETECTED
BASOPHILS # BLD: 0 % (ref 0–2)
BASOPHILS ABSOLUTE: 0 K/UL (ref 0–0.2)
BILIRUB SERPL-MCNC: 0.3 MG/DL (ref 0.3–1.2)
BUN SERPL-MCNC: 10 MG/DL (ref 6–20)
BUN/CREAT BLD: 21 (ref 9–20)
CALCIUM SERPL-MCNC: 9.1 MG/DL (ref 8.6–10.4)
CHLAMYDIA PNEUMONIAE BY PCR: NOT DETECTED
CHLORIDE SERPL-SCNC: 101 MMOL/L (ref 98–107)
CHOLEST SERPL-MCNC: 287 MG/DL
CHOLESTEROL/HDL RATIO: 6.4
CO2 SERPL-SCNC: 20 MMOL/L (ref 20–31)
CORONAVIRUS 229E PCR: NOT DETECTED
CORONAVIRUS HKU1 PCR: NOT DETECTED
CORONAVIRUS NL63 PCR: NOT DETECTED
CORONAVIRUS OC43 PCR: NOT DETECTED
CREAT SERPL-MCNC: 0.47 MG/DL (ref 0.5–0.9)
EOSINOPHILS RELATIVE PERCENT: 0 % (ref 1–4)
EST. AVERAGE GLUCOSE BLD GHB EST-MCNC: 309 MG/DL
FLUAV RNA NPH QL NAA+NON-PROBE: NOT DETECTED
FLUBV RNA NPH QL NAA+NON-PROBE: NOT DETECTED
GFR SERPL CREATININE-BSD FRML MDRD: >60 ML/MIN/1.73M2
GLUCOSE BLD-MCNC: 330 MG/DL (ref 74–100)
GLUCOSE BLD-MCNC: 366 MG/DL (ref 74–100)
GLUCOSE BLD-MCNC: 372 MG/DL (ref 74–100)
GLUCOSE SERPL-MCNC: 407 MG/DL (ref 70–99)
HBA1C MFR BLD: 12.4 % (ref 4–6)
HCT VFR BLD AUTO: 40 % (ref 36.3–47.1)
HDLC SERPL-MCNC: 45 MG/DL
HGB BLD-MCNC: 13.4 G/DL (ref 11.9–15.1)
HUMAN METAPNEUMOVIRUS PCR: NOT DETECTED
IMMATURE GRANULOCYTES: 0 %
LDLC SERPL CALC-MCNC: 186 MG/DL (ref 0–130)
LV EF: 65 %
LVEF MODALITY: NORMAL
LYMPHOCYTES # BLD: 11 % (ref 24–43)
MAGNESIUM SERPL-MCNC: 2.1 MG/DL (ref 1.6–2.6)
MCH RBC QN AUTO: 29.6 PG (ref 25.2–33.5)
MCHC RBC AUTO-ENTMCNC: 33.5 G/DL (ref 28.4–34.8)
MCV RBC AUTO: 88.5 FL (ref 82.6–102.9)
MONOCYTES # BLD: 0 % (ref 3–12)
MORPHOLOGY: ABNORMAL
MYCOPLASMA PNEUMONIAE PCR: NOT DETECTED
NRBC AUTOMATED: 0 PER 100 WBC
PARAINFLUENZA 1 PCR: NOT DETECTED
PARAINFLUENZA 2 PCR: NOT DETECTED
PARAINFLUENZA 3 PCR: NOT DETECTED
PARAINFLUENZA 4 PCR: NOT DETECTED
PDW BLD-RTO: 13.2 % (ref 11.8–14.4)
PLATELET # BLD AUTO: 378 K/UL (ref 138–453)
PMV BLD AUTO: 10.2 FL (ref 8.1–13.5)
POTASSIUM SERPL-SCNC: 4.4 MMOL/L (ref 3.7–5.3)
PROT SERPL-MCNC: 6.7 G/DL (ref 6.4–8.3)
RBC # BLD: 4.52 M/UL (ref 3.95–5.11)
RESP SYNCYTIAL VIRUS PCR: NOT DETECTED
RHINO/ENTEROVIRUS PCR: NOT DETECTED
SARS-COV-2 RNA NPH QL NAA+NON-PROBE: NOT DETECTED
SEG NEUTROPHILS: 89 % (ref 36–65)
SEGMENTED NEUTROPHILS ABSOLUTE COUNT: 20.11 K/UL (ref 1.5–8.1)
SODIUM SERPL-SCNC: 134 MMOL/L (ref 135–144)
SPECIMEN DESCRIPTION: NORMAL
TRIGL SERPL-MCNC: 278 MG/DL
WBC # BLD AUTO: 22.6 K/UL (ref 3.5–11.3)

## 2023-04-03 PROCEDURE — 99221 1ST HOSP IP/OBS SF/LOW 40: CPT | Performed by: FAMILY MEDICINE

## 2023-04-03 PROCEDURE — 83735 ASSAY OF MAGNESIUM: CPT

## 2023-04-03 PROCEDURE — 93242 EXT ECG>48HR<7D RECORDING: CPT

## 2023-04-03 PROCEDURE — 96372 THER/PROPH/DIAG INJ SC/IM: CPT

## 2023-04-03 PROCEDURE — 6360000004 HC RX CONTRAST MEDICATION: Performed by: NURSE PRACTITIONER

## 2023-04-03 PROCEDURE — G0378 HOSPITAL OBSERVATION PER HR: HCPCS

## 2023-04-03 PROCEDURE — 36415 COLL VENOUS BLD VENIPUNCTURE: CPT

## 2023-04-03 PROCEDURE — 96361 HYDRATE IV INFUSION ADD-ON: CPT

## 2023-04-03 PROCEDURE — 96376 TX/PRO/DX INJ SAME DRUG ADON: CPT

## 2023-04-03 PROCEDURE — 6370000000 HC RX 637 (ALT 250 FOR IP): Performed by: STUDENT IN AN ORGANIZED HEALTH CARE EDUCATION/TRAINING PROGRAM

## 2023-04-03 PROCEDURE — 82947 ASSAY GLUCOSE BLOOD QUANT: CPT

## 2023-04-03 PROCEDURE — 93243 EXT ECG>48HR<7D SCAN A/R: CPT

## 2023-04-03 PROCEDURE — 99222 1ST HOSP IP/OBS MODERATE 55: CPT | Performed by: PHYSICIAN ASSISTANT

## 2023-04-03 PROCEDURE — 94761 N-INVAS EAR/PLS OXIMETRY MLT: CPT

## 2023-04-03 PROCEDURE — 6370000000 HC RX 637 (ALT 250 FOR IP): Performed by: NURSE PRACTITIONER

## 2023-04-03 PROCEDURE — 0202U NFCT DS 22 TRGT SARS-COV-2: CPT

## 2023-04-03 PROCEDURE — 80053 COMPREHEN METABOLIC PANEL: CPT

## 2023-04-03 PROCEDURE — 93005 ELECTROCARDIOGRAM TRACING: CPT | Performed by: STUDENT IN AN ORGANIZED HEALTH CARE EDUCATION/TRAINING PROGRAM

## 2023-04-03 PROCEDURE — 85025 COMPLETE CBC W/AUTO DIFF WBC: CPT

## 2023-04-03 PROCEDURE — 6360000002 HC RX W HCPCS: Performed by: STUDENT IN AN ORGANIZED HEALTH CARE EDUCATION/TRAINING PROGRAM

## 2023-04-03 PROCEDURE — 93306 TTE W/DOPPLER COMPLETE: CPT

## 2023-04-03 PROCEDURE — 94640 AIRWAY INHALATION TREATMENT: CPT

## 2023-04-03 PROCEDURE — 71260 CT THORAX DX C+: CPT

## 2023-04-03 PROCEDURE — 80061 LIPID PANEL: CPT

## 2023-04-03 RX ORDER — FLUTICASONE PROPIONATE 44 UG/1
2 AEROSOL, METERED RESPIRATORY (INHALATION) 2 TIMES DAILY
Qty: 1 EACH | Refills: 3 | Status: SHIPPED | OUTPATIENT
Start: 2023-04-03

## 2023-04-03 RX ORDER — METOPROLOL SUCCINATE 25 MG/1
25 TABLET, EXTENDED RELEASE ORAL DAILY
Status: DISCONTINUED | OUTPATIENT
Start: 2023-04-03 | End: 2023-04-03

## 2023-04-03 RX ORDER — DILTIAZEM HYDROCHLORIDE 120 MG/1
120 CAPSULE, COATED, EXTENDED RELEASE ORAL DAILY
Status: DISCONTINUED | OUTPATIENT
Start: 2023-04-04 | End: 2023-04-03 | Stop reason: HOSPADM

## 2023-04-03 RX ORDER — PANTOPRAZOLE SODIUM 40 MG/1
40 TABLET, DELAYED RELEASE ORAL
Qty: 30 TABLET | Refills: 3 | Status: SHIPPED | OUTPATIENT
Start: 2023-04-04

## 2023-04-03 RX ORDER — ENOXAPARIN SODIUM 100 MG/ML
30 INJECTION SUBCUTANEOUS 2 TIMES DAILY
Status: DISCONTINUED | OUTPATIENT
Start: 2023-04-03 | End: 2023-04-03 | Stop reason: HOSPADM

## 2023-04-03 RX ORDER — FAMOTIDINE 20 MG/1
20 TABLET, FILM COATED ORAL 2 TIMES DAILY
Qty: 60 TABLET | Refills: 3 | Status: SHIPPED | OUTPATIENT
Start: 2023-04-03

## 2023-04-03 RX ORDER — DILTIAZEM HYDROCHLORIDE 120 MG/1
120 CAPSULE, COATED, EXTENDED RELEASE ORAL DAILY
Qty: 30 CAPSULE | Refills: 3 | Status: SHIPPED | OUTPATIENT
Start: 2023-04-04

## 2023-04-03 RX ORDER — PANTOPRAZOLE SODIUM 40 MG/1
40 TABLET, DELAYED RELEASE ORAL
Status: DISCONTINUED | OUTPATIENT
Start: 2023-04-03 | End: 2023-04-03 | Stop reason: HOSPADM

## 2023-04-03 RX ORDER — BENZONATATE 200 MG/1
200 CAPSULE ORAL 3 TIMES DAILY
Qty: 21 CAPSULE | Refills: 0 | Status: SHIPPED | OUTPATIENT
Start: 2023-04-03 | End: 2023-04-10

## 2023-04-03 RX ORDER — PREDNISONE 20 MG/1
40 TABLET ORAL DAILY
Qty: 10 TABLET | Refills: 0 | Status: SHIPPED | OUTPATIENT
Start: 2023-04-05 | End: 2023-04-10

## 2023-04-03 RX ORDER — INSULIN GLARGINE 100 [IU]/ML
20 INJECTION, SOLUTION SUBCUTANEOUS 2 TIMES DAILY
Status: DISCONTINUED | OUTPATIENT
Start: 2023-04-03 | End: 2023-04-03 | Stop reason: HOSPADM

## 2023-04-03 RX ADMIN — PANTOPRAZOLE SODIUM 40 MG: 40 TABLET, DELAYED RELEASE ORAL at 16:40

## 2023-04-03 RX ADMIN — ESCITALOPRAM 20 MG: 5 TABLET, FILM COATED ORAL at 07:54

## 2023-04-03 RX ADMIN — IPRATROPIUM BROMIDE AND ALBUTEROL SULFATE 1 AMPULE: 2.5; .5 SOLUTION RESPIRATORY (INHALATION) at 16:14

## 2023-04-03 RX ADMIN — IPRATROPIUM BROMIDE AND ALBUTEROL SULFATE 1 AMPULE: 2.5; .5 SOLUTION RESPIRATORY (INHALATION) at 10:34

## 2023-04-03 RX ADMIN — ENOXAPARIN SODIUM 40 MG: 100 INJECTION SUBCUTANEOUS at 07:54

## 2023-04-03 RX ADMIN — IOPAMIDOL 75 ML: 755 INJECTION, SOLUTION INTRAVENOUS at 09:10

## 2023-04-03 RX ADMIN — METOPROLOL SUCCINATE 25 MG: 25 TABLET, EXTENDED RELEASE ORAL at 10:12

## 2023-04-03 RX ADMIN — LISINOPRIL 5 MG: 5 TABLET ORAL at 07:54

## 2023-04-03 RX ADMIN — METHYLPREDNISOLONE SODIUM SUCCINATE 40 MG: 40 INJECTION, POWDER, FOR SOLUTION INTRAMUSCULAR; INTRAVENOUS at 07:54

## 2023-04-03 RX ADMIN — BUPROPION HYDROCHLORIDE 150 MG: 150 TABLET, EXTENDED RELEASE ORAL at 07:54

## 2023-04-03 RX ADMIN — GUAIFENESIN AND DEXTROMETHORPHAN HYDROBROMIDE 1 TABLET: 600; 30 TABLET, EXTENDED RELEASE ORAL at 10:12

## 2023-04-03 RX ADMIN — INSULIN LISPRO 16 UNITS: 100 INJECTION, SOLUTION INTRAVENOUS; SUBCUTANEOUS at 16:40

## 2023-04-03 RX ADMIN — FAMOTIDINE 20 MG: 20 TABLET, FILM COATED ORAL at 07:54

## 2023-04-03 RX ADMIN — METHYLPREDNISOLONE SODIUM SUCCINATE 40 MG: 40 INJECTION, POWDER, FOR SOLUTION INTRAMUSCULAR; INTRAVENOUS at 12:04

## 2023-04-03 RX ADMIN — PIOGLITAZONE 30 MG: 15 TABLET ORAL at 07:54

## 2023-04-03 RX ADMIN — INSULIN GLARGINE 20 UNITS: 100 INJECTION, SOLUTION SUBCUTANEOUS at 10:12

## 2023-04-03 RX ADMIN — METHYLPREDNISOLONE SODIUM SUCCINATE 40 MG: 40 INJECTION, POWDER, FOR SOLUTION INTRAMUSCULAR; INTRAVENOUS at 00:12

## 2023-04-03 RX ADMIN — GLIPIZIDE 5 MG: 5 TABLET ORAL at 16:40

## 2023-04-03 RX ADMIN — GLIPIZIDE 5 MG: 5 TABLET ORAL at 07:54

## 2023-04-03 RX ADMIN — INSULIN LISPRO 16 UNITS: 100 INJECTION, SOLUTION INTRAVENOUS; SUBCUTANEOUS at 07:54

## 2023-04-03 RX ADMIN — PANTOPRAZOLE SODIUM 40 MG: 40 TABLET, DELAYED RELEASE ORAL at 08:01

## 2023-04-03 RX ADMIN — INSULIN LISPRO 12 UNITS: 100 INJECTION, SOLUTION INTRAVENOUS; SUBCUTANEOUS at 12:04

## 2023-04-03 RX ADMIN — ATORVASTATIN CALCIUM 10 MG: 10 TABLET, FILM COATED ORAL at 07:54

## 2023-04-03 NOTE — DISCHARGE SUMMARY
No distention. no tenderness to palpation. EXT:     no edema bilaterally . No calf tenderness. NEURO: Moves all extremities. Motor and sensory are grossly intact  SKIN:    No rashes. No skin lesions. Significant Diagnostic Studies:   Lab Results   Component Value Date    WBC 22.6 (H) 04/03/2023    HGB 13.4 04/03/2023     04/03/2023       Lab Results   Component Value Date    BUN 10 04/03/2023    CREATININE 0.47 (L) 04/03/2023     (L) 04/03/2023    K 4.4 04/03/2023    CALCIUM 9.1 04/03/2023     04/03/2023    CO2 20 04/03/2023    LABGLOM >60 04/03/2023       Lab Results   Component Value Date    WBCUA None 04/02/2023    RBCUA None 04/02/2023    EPITHUA 0 TO 2 04/02/2023    LEUKOCYTESUR NEGATIVE 04/02/2023    SPECGRAV 1.010 04/02/2023    GLUCOSEU 3+ (A) 04/02/2023    KETUA 4+ (A) 04/02/2023    PROTEINU NEGATIVE 04/02/2023    HGBUR NEGATIVE 04/02/2023       CT CHEST W CONTRAST    Result Date: 4/3/2023  EXAMINATION: CT OF THE CHEST WITH CONTRAST 4/3/2023 9:02 am TECHNIQUE: CT of the chest was performed with the administration of intravenous contrast. Multiplanar reformatted images are provided for review. Automated exposure control, iterative reconstruction, and/or weight based adjustment of the mA/kV was utilized to reduce the radiation dose to as low as reasonably achievable. COMPARISON: Chest radiograph yesterday. HISTORY: ORDERING SYSTEM PROVIDED HISTORY: cough TECHNOLOGIST PROVIDED HISTORY: cough FINDINGS: Mediastinum: The heart and great vessels are normal in size. No pericardial effusion. No enlarged or suspicious-appearing lymph nodes are identified. Sequela of old granulomatous disease is noted with calcified subcarinal lymph nodes. Lungs/pleura: Respiratory motion artifact is noted. No acute airspace disease or effusion identified. Calcified nodule in the right lung base. The central airway is patent. Upper Abdomen: Findings compatible with hepatic steatosis.   Bilateral

## 2023-04-03 NOTE — PLAN OF CARE
Problem: Discharge Planning  Goal: Discharge to home or other facility with appropriate resources  4/3/2023 1732 by Xi Ledesma RN  Outcome: Completed  4/3/2023 1029 by Mahsa Funk RN  Outcome: Progressing  Flowsheets (Taken 4/3/2023 4891)  Discharge to home or other facility with appropriate resources: Identify barriers to discharge with patient and caregiver     Problem: Pain  Goal: Verbalizes/displays adequate comfort level or baseline comfort level  4/3/2023 1732 by Xi Ledesma RN  Outcome: Completed  Flowsheets (Taken 4/3/2023 1545 by Mahsa Funk RN)  Verbalizes/displays adequate comfort level or baseline comfort level:   Encourage patient to monitor pain and request assistance   Assess pain using appropriate pain scale   Administer analgesics based on type and severity of pain and evaluate response   Implement non-pharmacological measures as appropriate and evaluate response  4/3/2023 1029 by Mahsa Funk RN  Outcome: Progressing  Flowsheets (Taken 4/3/2023 8522)  Verbalizes/displays adequate comfort level or baseline comfort level:   Encourage patient to monitor pain and request assistance   Assess pain using appropriate pain scale   Administer analgesics based on type and severity of pain and evaluate response   Implement non-pharmacological measures as appropriate and evaluate response     Problem: Safety - Adult  Goal: Free from fall injury  4/3/2023 1732 by Xi Ledesma RN  Outcome: Completed  4/3/2023 1029 by Mahsa Funk RN  Outcome: Progressing  Flowsheets (Taken 4/3/2023 1028)  Free From Fall Injury: Instruct family/caregiver on patient safety     Problem: Chronic Conditions and Co-morbidities  Goal: Patient's chronic conditions and co-morbidity symptoms are monitored and maintained or improved  4/3/2023 1732 by Xi Ledesma RN  Outcome: Completed  4/3/2023 1029 by Mahsa Funk RN  Outcome: Progressing

## 2023-04-03 NOTE — DISCHARGE INSTR - DIET

## 2023-04-03 NOTE — PLAN OF CARE
Problem: Discharge Planning  Goal: Discharge to home or other facility with appropriate resources  4/3/2023 1029 by Juan Carlos Beavers RN  Outcome: Progressing  Flowsheets (Taken 4/3/2023 8422)  Discharge to home or other facility with appropriate resources: Identify barriers to discharge with patient and caregiver  4/3/2023 0238 by Merrie Boxer, RN  Outcome: Progressing     Problem: Pain  Goal: Verbalizes/displays adequate comfort level or baseline comfort level  4/3/2023 1029 by Juan Carlos Beavers RN  Outcome: Progressing  Flowsheets (Taken 4/3/2023 3429)  Verbalizes/displays adequate comfort level or baseline comfort level:   Encourage patient to monitor pain and request assistance   Assess pain using appropriate pain scale   Administer analgesics based on type and severity of pain and evaluate response   Implement non-pharmacological measures as appropriate and evaluate response  4/3/2023 0238 by Merrie Boxer, RN  Outcome: Progressing     Problem: Safety - Adult  Goal: Free from fall injury  4/3/2023 1029 by Juan Carlos Beavers RN  Outcome: Progressing  Flowsheets (Taken 4/3/2023 1028)  Free From Fall Injury: Instruct family/caregiver on patient safety  4/3/2023 0238 by Merrie Boxer, RN  Outcome: Progressing     Problem: Chronic Conditions and Co-morbidities  Goal: Patient's chronic conditions and co-morbidity symptoms are monitored and maintained or improved  Outcome: Progressing

## 2023-04-03 NOTE — CONSULTS
Pharmacist Review and Automatic Dose Adjustment of prophylactic enoxaparin      The reviewing pharmacist has made an adjustment to the ordered enoxaparin dose or converted to UFH per the approved [address] [place] [address]  protocol and table as identified below. Abilio Shearer is a 44 y.o. female. Recent Labs     04/02/23  1658 04/03/23  0610   CREATININE 0.55 0.47*       Estimated Creatinine Clearance: 188 mL/min (A) (based on SCr of 0.47 mg/dL (L)). Height:   Ht Readings from Last 1 Encounters:   04/02/23 5' 3\" (1.6 m)     Weight:  Wt Readings from Last 1 Encounters:   04/02/23 231 lb (104.8 kg)         Enoxaparin Indication: DVT prophylaxis          Plan: Increase Lovenox to 30 mg SUBQ BID for patient weight 104 kg. Thank you,  Kaylin Bae.  Jamie Jain, [facility], 4/3/2023, 10:32 AM

## 2023-04-03 NOTE — CARE COORDINATION
company assistance programs. Pt is aware that she may call SW if she needs further assistance in the future. Pt plans to return home with spouse at discharge. Pt identifies no discharge needs or concerns at this time. SW will remain available as needed.  Ayesha MACK LSW 4/3/2023     The Plan for Transition of Care is related to the following treatment goals of Sinus tachycardia [R00.0]  Hyperglycemia [R73.9]  Severe persistent asthma with exacerbation [J45.51]  Recurrent cough [R05.8]        The Patient and/or Patient Representative Agree with the Discharge Plan?  yes    Ayesha MACK [facility]   Case Management Department  Ph: [phone] Fax: [phone]

## 2023-04-03 NOTE — CONSULTS
DIABETES EDUCATION  Patient seen in room for diabetes education. She was diagnosed with Type 2 diabetes in her late teen's and was prescribed Metformin which she had an allergic reaction to. Current treatment is Levemir 10 units, Amaryl BID and Actos. Her A1C is pending but previous reading almost a year ago was 13.4. She monitors glucose BID (am & pm) at home and says they run 100-120. She had COVID in February and another issue recently with cough and has been treated with steroids. Discuss how this impacts glucose. On presentation to ER her glucose was around 380. Denies any questions. Reports her father and brother both have diabetes and \"I know all about it. \" She has struggled with insurance and coverage of medications. Discuss options and she says she is aware of all the options. Discuss the need to take Amaryl with a meal and does admit to some episodes at times of hypoglycemia. She does carry glucose tablets. Educated on signs, symptoms, causes, and treatment of hypoglycemia. Survival Skills Guide is given. Review insulin storage and administration as well as sharps disposal. Encourage CGM as an option moving forward if her insurance will cover and discuss utilizing Good [employer] as option and paying cash if insurance does not approve. Verbalizes understanding. Office number is given to call with questions or concerns. Did Dietitian see patient? [] Yes  [x] No    Does patient have a monitor and strips? [x] Yes      [] No        Frequency of monitoring at home: 2 times a day    Can patient afford medications? [x]  Yes     [] No   Medication taking at home:    [x] Oral medication   [x] Insulin     [] Other     [] None    New to insulin? [] Yes       [x] No   Instructed on insulin use? [x] Yes      [] No       [] N/A    \"SAFE\" HANDOUTS:   [x] Where do I Begin?  booklet   [x] What is Diabetes? [] What is Insulin?               [x] Hypo- hyperglycemia    [x] Diabetes pills

## 2023-04-03 NOTE — H&P
lisinopril (PRINIVIL;ZESTRIL) 5 MG tablet Take 1 tablet by mouth daily 1/21/20   Historical Provider, MD   levonorgestrel [facility]) IUD 52 mg 1 each by IntraUTERine route once    Historical Provider, MD   albuterol sulfate HFA (PROAIR HFA) 108 (90 Base) MCG/ACT inhaler Inhale 2 puffs into the lungs every 6 hours as needed for Wheezing or Shortness of Breath 8/12/20   Sharon Melton, APRN - CNP       Review of Systems:  Constitutional:negative  for fevers, and negative for chills. Eyes: negative for visual disturbance   ENT: negative for sore throat, negative nasal congestion, and negative for earache  Respiratory: negative for shortness of breath, positive for cough, and negative for wheezing  Cardiovascular: negative for chest pain, negative for palpitations, and negative for syncope  Gastrointestinal: negative for abdominal pain, negative for nausea,negative for vomiting, negative for diarrhea, negative for constipation, and negative for hematochezia or melena  Genitourinary: negative for dysuria, negative for urinary urgency, negative for urinary frequency, and negative for hematuria  Skin: negative for skin rash, and negative for skin lesions  Neurological: negative for unilateral weakness, numbness or tingling. Physical Exam:    Vitals:   Temp: (!) 96.7 °F (35.9 °C)  BP: 137/64  Resp: 16  Heart Rate: (!) 107  SpO2: 96 %  24HR INTAKE/OUTPUT:    Intake/Output Summary (Last 24 hours) at 4/3/2023 0872  Last data filed at 4/3/2023 0552  Gross per 24 hour   Intake 978 ml   Output 200 ml   Net 778 ml       Weight    Body mass index is 40.92 kg/m². Exam:  GEN:    Awake, alert and oriented x3. EYES:  EOMI, pupils equal   NECK: Supple. No lymphadenopathy. No carotid bruit  CVS:    regular but tachycardic, no audible murmur  PULM:  CTA, no wheezes, rales or rhonchi, no acute respiratory distress  ABD:    Bowels sounds normal.  Abdomen is soft. No distention. no tenderness to palpation.    EXT:   no edema

## 2023-04-04 ENCOUNTER — CARE COORDINATION (OUTPATIENT)
Dept: CASE MANAGEMENT | Age: 38
End: 2023-04-04

## 2023-04-04 PROBLEM — I47.11 INAPPROPRIATE SINUS TACHYCARDIA: Status: ACTIVE | Noted: 2023-04-02

## 2023-04-04 PROBLEM — J45.51 SEVERE PERSISTENT ASTHMA WITH EXACERBATION: Status: ACTIVE | Noted: 2023-04-04

## 2023-04-04 LAB
25(OH)D3 SERPL-MCNC: 16 NG/ML
EKG ATRIAL RATE: 104 BPM
EKG P AXIS: 60 DEGREES
EKG P-R INTERVAL: 158 MS
EKG Q-T INTERVAL: 346 MS
EKG QRS DURATION: 74 MS
EKG QTC CALCULATION (BAZETT): 454 MS
EKG R AXIS: 26 DEGREES
EKG T AXIS: 55 DEGREES
EKG VENTRICULAR RATE: 104 BPM

## 2023-04-04 PROCEDURE — 93010 ELECTROCARDIOGRAM REPORT: CPT | Performed by: FAMILY MEDICINE

## 2023-04-04 NOTE — PROGRESS NOTES
[address]  Inpatient/Observation/Outpatient Rehabilitation    Date: 4/3/2023  Patient Name: Rafi Husbands       [x] Inpatient Acute/Observation       []  Outpatient  : 1985       [] Pt no showed for scheduled appointment    [] Pt refused/declined therapy at this time due to:           [x] Pt cancelled due to:  [] No Reason Given   [] Sick/ill   [x] Other: Per nursing and patient, pt. is independent with ADL's and requires no OT at this time.          Rosa Isela Francis OT Date: 4/3/2023
Dr. Rony Andujar notified of patients blood sugar level and that her heart rate has been in the 120s, new orders placed at this time.
Echocardiogram/Doppler done at bedside. Instructed on policies and procedure.
Magnesium sulfate 1000mg in dextrose 5% 100mL X2 doses completed at this time as ordered by Dr. Sandie Al.
Patient admitted to room 332 at this time. Patient is alert and oriented x4 and denies pain at this time. Patient is accompanied by her . Patient admission assessment and vitals completed along with navigator and medication list. Patient denies needs at this time. Care is ongoing.
Patient pacing back and forth in her room and states that she is very anxious and that she hates hospitals. Requested PRN anxiety medication given at this time.
Patient second assessment and vitals completed at this time. Patient denies pain and is resting in bed. Patient states no needs at this time. Call light is in reach, care ongoing.
[facility]  Inpatient/Observation/Outpatient Rehabilitation    Date: 4/3/2023  Patient Name: Norris Borges       [x] Inpatient Acute/Observation       []  Outpatient  : 1985       [] Pt no showed for scheduled appointment    [] Pt refused/declined therapy at this time due to:           [x] Pt cancelled due to:  [] No Reason Given   [] Sick/ill   [] Other:per nurse and patient, pt is completely independent with all functional mobility, no need to eval at this time    Therapist/Assistant will attempt to see this patient, at our earliest opportunity.        Sajan Chairez, PT Date: 4/3/2023
Pt resting in bed, assessment and vitals complete, see flow sheet, states she has non-productive cough intermittently, denies pain, all needs met, call light within reach.
Writer reviewed discharge instructions with patient. Patient aware of need to  prescriptions and aware of follow up appointment. Patient aware of need to call and schedule follow up appointment with [facility] in Cardiology office. Patient with CAM monitor in place. Patient denies questions. Copy of discharge instructions given to patient.
(MUCINEX DM)  MG per extended release tablet Take 1 tablet by mouth 2 times daily for 10 days 20 tablet 0    benzonatate (TESSALON) 200 MG capsule Take 1 capsule by mouth in the morning, at noon, and at bedtime for 7 days 21 capsule 0    [START ON 4/4/2023] pantoprazole (PROTONIX) 40 MG tablet Take 1 tablet by mouth 2 times daily (before meals) 30 tablet 3    famotidine (PEPCID) 20 MG tablet Take 1 tablet by mouth 2 times daily 60 tablet 3    [START ON 4/4/2023] dilTIAZem (CARDIZEM CD) 120 MG extended release capsule Take 1 capsule by mouth daily 30 capsule 3    [START ON 4/5/2023] predniSONE (DELTASONE) 20 MG tablet Take 2 tablets by mouth daily for 5 days 10 tablet 0       No current facility-administered medications for this encounter. FAMILY HISTORY: family history includes Diabetes in her father; Diabetes type 2  in her brother; Heart Attack in her maternal grandfather; Heart Disease in her father; High Blood Pressure in her father; Stroke in her father. PHYSICAL EXAM:   /76   Pulse (!) 108   Temp 98 °F (36.7 °C) (Temporal)   Resp 16   Ht 5' 3\" (1.6 m)   Wt 231 lb (104.8 kg)   SpO2 96%   BMI 40.92 kg/m²  Body mass index is 40.92 kg/m². Constitutional: She is oriented to person, place, and time. She appears well-developed and well-nourished. In no acute distress. HEENT: Normocephalic and atraumatic. No JVD present. Carotid bruit is not present. No mass and no thyromegaly present. No lymphadenopathy present. Cardiovascular: Tachycardic rate, regular rhythm, normal heart sounds. Exam reveals no gallop and no friction rubs. A I/VI systolic murmur was heard at the base of the heart without significant radiation. Pulmonary/Chest: Effort normal and breath sounds normal. No respiratory distress. She has no wheezes, rhonchi or rales. Abdominal: Soft, non-tender. Bowel sounds and aorta are normal. She exhibits no organomegaly, mass or bruit. Extremities: No edema.   No cyanosis and no
lab values    Nutrition Interventions:   Food and/or Nutrient Delivery: Continue Current Diet  Nutrition Education/Counseling: Education needed  Coordination of Nutrition Care: Continue to monitor while inpatient  Plan of Care discussed with: DM educator    Goals:     Goals: Meet at least 75% of estimated needs       Nutrition Monitoring and Evaluation:   Behavioral-Environmental Outcomes: None Identified  Food/Nutrient Intake Outcomes: Food and Nutrient Intake  Physical Signs/Symptoms Outcomes: Biochemical Data, Weight    Discharge Planning:    Continue current diet     Sd Chavira, [address], [place]  Contact: [phone]
diaphoresis. Psychiatric: She has a normal mood and affect. Her speech is normal and behavior is normal.      MOST RECENT LABS ON RECORD:   Lab Results   Component Value Date    WBC 22.6 (H) 04/03/2023    HGB 13.4 04/03/2023    HCT 40.0 04/03/2023     04/03/2023    CHOL 144 04/08/2021    TRIG 112 04/08/2021    HDL 40 (L) 04/08/2021    LDLCHOLESTEROL 82 04/08/2021    ALT 14 04/03/2023    AST 10 04/03/2023     (L) 04/03/2023    K 4.4 04/03/2023     04/03/2023    CREATININE 0.47 (L) 04/03/2023    BUN 10 04/03/2023    CO2 20 04/03/2023    TSH 3.89 04/02/2023    LABA1C 13.4 04/18/2022    LABMICR CANNOT BE CALCULATED 04/08/2021        ASSESSMENT:  Patient Active Problem List    Diagnosis Date Noted    PTSD (post-traumatic stress disorder) 04/03/2023    Recurrent cough 04/02/2023    Tachycardia 04/02/2023    Current mild episode of major depressive disorder without prior episode ([facility] 04/07/2021    Other hyperlipidemia 01/06/2021    Anxiety 11/04/2020    Moderate [facility] 11/04/2020    Panic attacks 01/12/2018    Encounter for insertion of intrauterine contraceptive device 02/16/2017    Diabetes mellitus ([facility] 01/15/2015    Asthma 02/21/2014    Mild intermittent asthma without complication 00/79/4619       PLAN:  Tachycardia: Most likely secondary to acute viral illness. Asymptomatic. Will continue to monitor. Heart rate has been less than 110 bpm today. Telemetry reviewed. Beta Blocker: STOP Metoprolol succinate (Toprol XL)   Calcium Channel Blocker: START diltiazem CD (Cardizem CD) 120 mg once daily. I also discussed the potential side effects of this medication including lightheadedness and dizziness and instructed them to stop the medication of this occurs and call our office if this occurs. Not indicated at this time. Echo results pending. Will plan on having her wear a CAM  monitor for 7 days at discharge. Outpatient order placed.     Essential

## 2023-04-04 NOTE — CARE COORDINATION
[facility] Care Transitions Initial Follow Up Call    Call within 2 business days of discharge: Yes    Patient Current Location: [facility]      Patient: Valentino Ace Patient : 1985   MRN: [id number]  Reason for Admission: Recurrent cough  ASTHMA  Discharge Date: 4/3/23 RARS: No data recorded    Last Discharge  Street       Date Complaint Diagnosis Description Type Department Provider    23 Cough Severe persistent asthma with exacerbation . .. ED [facility]one [facility]; Juan R Condon. .. Attempted to contact patient for 24 hour initial transitional call. Unable to reach patient. Caller left a Hipaa compliant message introducing self, role, nature of the call and contact information for a return call. Has an appt with PCP's office 23    Non-face-to-face services provided:  Obtained and reviewed discharge summary and/or continuity of care documents        Care Transitions 24 Hour Call    Care Transitions Interventions       Is follow up appointment scheduled within 7 days of discharge? Yes.     Follow Up  Future Appointments   Date Time Provider Nandini Ojeda   2023  2:45 PM [facility] Braone [facility]           [address] Care Transitions Nurse   [phone]

## 2023-04-05 ENCOUNTER — CARE COORDINATION (OUTPATIENT)
Dept: CASE MANAGEMENT | Age: 38
End: 2023-04-05

## 2023-04-05 NOTE — CARE COORDINATION
Care Transitions Outreach Attempt #2    Call within 2 business days of discharge: Yes   Attempt #2 to reach patient for transitions of care follow up. Unable to reach patient. Voicemail left requesting call back. Patient: Nely Desai Patient : 1985 MRN: [id number]    Last Discharge [address]       Date Complaint Diagnosis Description Type Department Provider    23 Cough Severe persistent asthma with exacerbation . .. ED to Hosp-Admission (Discharged) (ADMITTED) [facility] Meryl Bishop MD; Blanche Grajeda. .. Was this an external facility discharge?  No Discharge Facility: [facility]    Noted following upcoming appointments from discharge chart review:   [facility] follow up appointment(s):   Future Appointments   Date Time Provider Nandini Ojeda   2023  2:45 PM [facility]     Rose Marie Puga, RN BSN   Care Transitions Nurse  [phone]

## 2023-04-11 PROBLEM — E66.01 SEVERE OBESITY (BMI 35.0-39.9) WITH COMORBIDITY (HCC): Status: ACTIVE | Noted: 2023-04-11

## 2023-04-11 PROBLEM — Z09 HOSPITAL DISCHARGE FOLLOW-UP: Status: ACTIVE | Noted: 2023-04-11

## 2023-04-11 PROBLEM — J45.41 MODERATE PERSISTENT ASTHMA WITH EXACERBATION: Status: ACTIVE | Noted: 2023-04-11

## 2023-04-11 PROBLEM — R05.9 COUGH: Status: ACTIVE | Noted: 2023-04-11

## 2023-04-20 RX ORDER — PIOGLITAZONEHYDROCHLORIDE 30 MG/1
TABLET ORAL
Qty: 90 TABLET | Refills: 1 | Status: SHIPPED | OUTPATIENT
Start: 2023-04-20

## 2023-04-21 DIAGNOSIS — J45.41 MODERATE PERSISTENT ASTHMA WITH EXACERBATION: ICD-10-CM

## 2023-04-21 RX ORDER — BUDESONIDE 0.5 MG/2ML
1 INHALANT ORAL 2 TIMES DAILY
Qty: 60 EACH | Refills: 3 | OUTPATIENT
Start: 2023-04-21

## 2023-04-27 ENCOUNTER — OFFICE VISIT (OUTPATIENT)
Dept: PRIMARY CARE CLINIC | Age: 38
End: 2023-04-27
Payer: COMMERCIAL

## 2023-04-27 VITALS
WEIGHT: 225.6 LBS | RESPIRATION RATE: 18 BRPM | SYSTOLIC BLOOD PRESSURE: 128 MMHG | HEART RATE: 112 BPM | DIASTOLIC BLOOD PRESSURE: 86 MMHG | OXYGEN SATURATION: 98 % | BODY MASS INDEX: 39.96 KG/M2

## 2023-04-27 DIAGNOSIS — J30.2 SEASONAL ALLERGIES: ICD-10-CM

## 2023-04-27 DIAGNOSIS — R05.9 COUGH, UNSPECIFIED TYPE: Primary | ICD-10-CM

## 2023-04-27 PROCEDURE — 99214 OFFICE O/P EST MOD 30 MIN: CPT | Performed by: STUDENT IN AN ORGANIZED HEALTH CARE EDUCATION/TRAINING PROGRAM

## 2023-04-27 RX ORDER — FEXOFENADINE HCL 180 MG/1
180 TABLET ORAL DAILY
COMMUNITY

## 2023-04-27 RX ORDER — PIOGLITAZONEHYDROCHLORIDE 30 MG/1
30 TABLET ORAL DAILY
Qty: 90 TABLET | Refills: 0 | Status: SHIPPED | OUTPATIENT
Start: 2023-04-27

## 2023-04-27 RX ORDER — BUDESONIDE 1 MG/2ML
1 INHALANT ORAL 2 TIMES DAILY
Qty: 2 ML | Refills: 3 | Status: SHIPPED | OUTPATIENT
Start: 2023-04-27

## 2023-04-27 RX ORDER — MONTELUKAST SODIUM 10 MG/1
10 TABLET ORAL DAILY
Qty: 30 TABLET | Refills: 3 | Status: SHIPPED | OUTPATIENT
Start: 2023-04-27

## 2023-04-27 NOTE — PROGRESS NOTES
is no tenderness. Musculoskeletal:  Patient exhibits no edema and tenderness. Patient exhibits no deformity. Neurological: Patient is alert and oriented to person, place, and time. Skin: Skin is warm and dry. Patient is not diaphoretic. Psychiatric: Patient's speech is normal and behavior is normal. Thought content normal.   Vitals reviewed. Lab Results   Component Value Date    WBC 22.6 (H) 04/03/2023    HGB 13.4 04/03/2023    HCT 40.0 04/03/2023     04/03/2023    CHOL 287 (H) 04/03/2023    TRIG 278 (H) 04/03/2023    HDL 45 04/03/2023    ALT 14 04/03/2023    AST 10 04/03/2023     (L) 04/03/2023    K 4.4 04/03/2023     04/03/2023    CREATININE 0.47 (L) 04/03/2023    BUN 10 04/03/2023    CO2 20 04/03/2023    TSH 3.89 04/02/2023    LABA1C 12.4 (H) 04/02/2023    LABMICR CANNOT BE CALCULATED 04/08/2021     Lab Results   Component Value Date    CALCIUM 9.1 04/03/2023     Lab Results   Component Value Date    LDLCALC 172 (H) 09/26/2020    LDLCHOLESTEROL 186 (H) 04/03/2023       Please note that this chart was generated using voice recognition Dragon dictation software. Although every effort was made to ensure the accuracy of this automated transcription, some errors in transcription may have occurred.     Electronically signed by Dr. Lacy Cabezas MD on 4/27/2023 at 8:38 AM

## 2023-05-11 PROBLEM — R05.9 COUGH: Status: RESOLVED | Noted: 2023-04-11 | Resolved: 2023-05-11

## 2023-05-11 PROBLEM — Z09 HOSPITAL DISCHARGE FOLLOW-UP: Status: RESOLVED | Noted: 2023-04-11 | Resolved: 2023-05-11

## 2023-05-31 DIAGNOSIS — E11.9 TYPE 2 DIABETES MELLITUS WITHOUT COMPLICATION, WITHOUT LONG-TERM CURRENT USE OF INSULIN (HCC): Primary | ICD-10-CM

## 2023-05-31 DIAGNOSIS — E78.49 OTHER HYPERLIPIDEMIA: ICD-10-CM

## 2023-05-31 DIAGNOSIS — R00.0 INAPPROPRIATE SINUS TACHYCARDIA: ICD-10-CM

## 2023-06-14 RX ORDER — GLIMEPIRIDE 2 MG/1
TABLET ORAL
Qty: 180 TABLET | Refills: 0 | Status: SHIPPED | OUTPATIENT
Start: 2023-06-14

## 2023-07-17 RX ORDER — PIOGLITAZONEHYDROCHLORIDE 30 MG/1
TABLET ORAL
Qty: 30 TABLET | Refills: 0 | Status: SHIPPED | OUTPATIENT
Start: 2023-07-17

## 2023-08-02 ENCOUNTER — HOSPITAL ENCOUNTER (OUTPATIENT)
Age: 38
Discharge: HOME | End: 2023-08-02
Payer: COMMERCIAL

## 2023-08-02 DIAGNOSIS — R00.0 INAPPROPRIATE SINUS TACHYCARDIA: ICD-10-CM

## 2023-08-02 DIAGNOSIS — E78.49 OTHER HYPERLIPIDEMIA: ICD-10-CM

## 2023-08-02 DIAGNOSIS — E11.9 TYPE 2 DIABETES MELLITUS WITHOUT COMPLICATION, WITHOUT LONG-TERM CURRENT USE OF INSULIN (HCC): ICD-10-CM

## 2023-08-02 LAB
ALBUMIN SERPL-MCNC: 3.6 G/DL (ref 3.5–5.2)
ALBUMIN/GLOB SERPL: 1.1 {RATIO} (ref 1–2.5)
ALP SERPL-CCNC: 66 U/L (ref 35–104)
ALT SERPL-CCNC: 15 U/L (ref 5–33)
ANION GAP SERPL CALCULATED.3IONS-SCNC: 9 MMOL/L (ref 9–17)
AST SERPL-CCNC: 10 U/L
BILIRUB SERPL-MCNC: 0.4 MG/DL (ref 0.3–1.2)
BUN SERPL-MCNC: 7 MG/DL (ref 6–20)
BUN/CREAT SERPL: 18 (ref 9–20)
CALCIUM SERPL-MCNC: 9 MG/DL (ref 8.6–10.4)
CHLORIDE SERPL-SCNC: 100 MMOL/L (ref 98–107)
CHOLEST SERPL-MCNC: 236 MG/DL
CHOLESTEROL/HDL RATIO: 5.8
CO2 SERPL-SCNC: 27 MMOL/L (ref 20–31)
CREAT SERPL-MCNC: 0.4 MG/DL (ref 0.5–0.9)
ERYTHROCYTE [DISTWIDTH] IN BLOOD BY AUTOMATED COUNT: 12.8 % (ref 11.8–14.4)
EST. AVERAGE GLUCOSE BLD GHB EST-MCNC: 275 MG/DL
GFR SERPL CREATININE-BSD FRML MDRD: >60 ML/MIN/1.73M2
GLUCOSE SERPL-MCNC: 278 MG/DL (ref 70–99)
HBA1C MFR BLD: 11.2 % (ref 4–6)
HCT VFR BLD AUTO: 41.3 % (ref 36.3–47.1)
HDLC SERPL-MCNC: 41 MG/DL
HGB BLD-MCNC: 13.9 G/DL (ref 11.9–15.1)
LDLC SERPL CALC-MCNC: 147 MG/DL (ref 0–130)
MCH RBC QN AUTO: 30 PG (ref 25.2–33.5)
MCHC RBC AUTO-ENTMCNC: 33.7 G/DL (ref 28.4–34.8)
MCV RBC AUTO: 89 FL (ref 82.6–102.9)
NRBC BLD-RTO: 0 PER 100 WBC
PLATELET # BLD AUTO: 397 K/UL (ref 138–453)
PMV BLD AUTO: 10.1 FL (ref 8.1–13.5)
POTASSIUM SERPL-SCNC: 4.3 MMOL/L (ref 3.7–5.3)
PROT SERPL-MCNC: 6.8 G/DL (ref 6.4–8.3)
RBC # BLD AUTO: 4.64 M/UL (ref 3.95–5.11)
SODIUM SERPL-SCNC: 136 MMOL/L (ref 135–144)
TRIGL SERPL-MCNC: 241 MG/DL
WBC OTHER # BLD: 11.9 K/UL (ref 3.5–11.3)

## 2023-08-02 PROCEDURE — 36415 COLL VENOUS BLD VENIPUNCTURE: CPT

## 2023-08-02 PROCEDURE — 85027 COMPLETE CBC AUTOMATED: CPT

## 2023-08-02 PROCEDURE — 80053 COMPREHEN METABOLIC PANEL: CPT

## 2023-08-02 PROCEDURE — 80061 LIPID PANEL: CPT

## 2023-08-02 PROCEDURE — 83036 HEMOGLOBIN GLYCOSYLATED A1C: CPT

## 2023-08-04 ENCOUNTER — CARE COORDINATION (OUTPATIENT)
Dept: CARE COORDINATION | Age: 38
End: 2023-08-04

## 2023-08-04 ENCOUNTER — OFFICE VISIT (OUTPATIENT)
Dept: PRIMARY CARE CLINIC | Age: 38
End: 2023-08-04

## 2023-08-04 VITALS
DIASTOLIC BLOOD PRESSURE: 70 MMHG | BODY MASS INDEX: 41.11 KG/M2 | SYSTOLIC BLOOD PRESSURE: 102 MMHG | HEART RATE: 104 BPM | OXYGEN SATURATION: 98 % | RESPIRATION RATE: 18 BRPM | HEIGHT: 63 IN | TEMPERATURE: 97.9 F | WEIGHT: 232 LBS

## 2023-08-04 DIAGNOSIS — Z23 NEED FOR TDAP VACCINATION: ICD-10-CM

## 2023-08-04 DIAGNOSIS — F32.0 CURRENT MILD EPISODE OF MAJOR DEPRESSIVE DISORDER WITHOUT PRIOR EPISODE (HCC): ICD-10-CM

## 2023-08-04 DIAGNOSIS — R05.8 RECURRENT COUGH: ICD-10-CM

## 2023-08-04 DIAGNOSIS — Z12.4 CERVICAL CANCER SCREENING: ICD-10-CM

## 2023-08-04 DIAGNOSIS — F33.1 MODERATE EPISODE OF RECURRENT MAJOR DEPRESSIVE DISORDER (HCC): ICD-10-CM

## 2023-08-04 DIAGNOSIS — R00.0 INAPPROPRIATE SINUS TACHYCARDIA: ICD-10-CM

## 2023-08-04 DIAGNOSIS — E11.65 TYPE 2 DIABETES MELLITUS WITH HYPERGLYCEMIA, WITHOUT LONG-TERM CURRENT USE OF INSULIN (HCC): Primary | ICD-10-CM

## 2023-08-04 RX ORDER — MONTELUKAST SODIUM 10 MG/1
10 TABLET ORAL DAILY
Qty: 30 TABLET | Refills: 3 | Status: SHIPPED | OUTPATIENT
Start: 2023-08-04

## 2023-08-04 RX ORDER — FLUTICASONE PROPIONATE AND SALMETEROL XINAFOATE 45; 21 UG/1; UG/1
2 AEROSOL, METERED RESPIRATORY (INHALATION) 2 TIMES DAILY
Qty: 1 EACH | Refills: 3 | Status: SHIPPED | OUTPATIENT
Start: 2023-08-04

## 2023-08-04 RX ORDER — PIOGLITAZONEHYDROCHLORIDE 45 MG/1
45 TABLET ORAL DAILY
Qty: 30 TABLET | Refills: 5 | Status: SHIPPED | OUTPATIENT
Start: 2023-08-04

## 2023-08-04 RX ORDER — ALBUTEROL SULFATE 90 UG/1
2 AEROSOL, METERED RESPIRATORY (INHALATION) EVERY 4 HOURS PRN
Qty: 18 G | Refills: 5 | Status: SHIPPED | OUTPATIENT
Start: 2023-08-04

## 2023-08-04 RX ORDER — DILTIAZEM HYDROCHLORIDE 120 MG/1
120 CAPSULE, COATED, EXTENDED RELEASE ORAL DAILY
Qty: 30 CAPSULE | Refills: 5 | Status: SHIPPED | OUTPATIENT
Start: 2023-08-04

## 2023-08-04 RX ORDER — ROSUVASTATIN CALCIUM 10 MG/1
10 TABLET, COATED ORAL DAILY
Qty: 30 TABLET | Refills: 5 | Status: SHIPPED | OUTPATIENT
Start: 2023-08-04

## 2023-08-04 NOTE — PROGRESS NOTES
Attending diabetes education: no  Diabetic diet/low carb diet compliance:  {Desc; compliance:5303::\"compliant most of the time\"}  Current exercise: {EXERCISE NRCR:694221869}  Frequency of exercise: *** times per week  Frequency of glucose testing at home: ***  Home blood sugar records: ***  Glucometer at home: ***  History of hypoglycemic episodes: {yes***/no:20506}  Eye exam current (within one year): {yes/no/unknown:74}. Last eye exam: ***  Diabetic foot check in the past year {yes no:808818::\"Yes\"}. Last diabetic foot check: ***  Current symptoms: Denies excessive hunger, excessive thirst, or increased frequency of urination. Denies fatigue, numbness/tingling in hands or feet, or major changes in weight. Denies vision changes or sores that are slow to heal.    reports that she has never smoked.  She has never used smokeless tobacco.   Medication compliance:  {Desc; compliance:5303::\"compliant most of the time\"}
Component Value Date/Time    WBC 11.9 08/02/2023 08:54 AM    RBC 4.64 08/02/2023 08:54 AM    RBC 4.97 09/26/2020 09:52 AM    HGB 13.9 08/02/2023 08:54 AM    HCT 41.3 08/02/2023 08:54 AM    MCV 89.0 08/02/2023 08:54 AM    MCH 30.0 08/02/2023 08:54 AM    MCHC 33.7 08/02/2023 08:54 AM    RDW 12.8 08/02/2023 08:54 AM     08/02/2023 08:54 AM    MPV 10.1 08/02/2023 08:54 AM     Lab Results   Component Value Date/Time    TSH 3.89 04/02/2023 04:58 PM     Lab Results   Component Value Date/Time    CHOL 236 08/02/2023 08:55 AM    HDL 41 08/02/2023 08:55 AM    LABA1C 11.2 08/02/2023 08:54 AM       Assessment/Plan:      Diagnosis Orders   1. Need for Tdap vaccination  Tdap, BOOSTRIX, (age 8 yrs+), IM      2. Moderate episode of recurrent major depressive disorder ([facility])        3. Type 2 diabetes mellitus with hyperglycemia, without long-term current use of insulin [facility])  External Referral To Endocrinology      4. Type 2 diabetes mellitus without complication, without long-term current use of insulin ([facility])  insulin detemir (LEVEMIR FLEXTOUCH) 100 UNIT/ML injection pen      5.  Cervical cancer screening  [address] [place] [address], Jacek Cuellar [facility], Gynecology, [place]        Diabetes:   - Referral to diabetes education placed today   - Increase actos from 30mg to 45mg once daily   - Continue glimepiride 2mg twice daily   - Increase levemir from 10 units to 14 units nightly   - Referral to Dr. Manas Lux endocrinology   - Complete eye exam   - Notify office of glucose readings 1 week     Tachycardia:  - Restart diltiazem 120mg once daily   - Echo completed 4/2023 EF 65%, mild left atrium dilation, mild grade 1 diastolic dysfunction  - Patient was ordered continuous cardiac monitor, but it does not appear patient completed this    Anxiety, Depression:   - Uncontrolled, patient unable to afford medication and self-discontinued lexapro and wellbutrin several months ago   - Cristofer Wayne RN case management involved to assist with cost of

## 2023-08-04 NOTE — CARE COORDINATION
Received patient as PCP urgent referral for care coordination. See referral note below:     Stephen Briggs, BRIDGETT - BHAVYA Parada, RN  Please see HPI and Plan. Alyssa will call you to explain       CC outreach call placed to patient. Unable to reach Amanda x 2 attempts today. Left a voicemail message requesting a return phone call back to this [facility] when patient is able to [phone], office hours given. Future Appointments   Date Time Provider [address]   8/14/2023  9:30 AM Christa Greer MD Critical [facility] hospital   9/1/2023  9:20 AM BRIDGETT Chawla - CNP [facility]) [facility]         Care Coordination Plan of Care: This nurse Care Coordinator will  - await call back from patient, and if no return call; will attempt to reach patient back again next week    -discuss changing prescriptions for Lexapro and Wellbutrin XL to [facility] in [place] as prices are much cheaper there with Good Rx card.  Give savings.   -enroll and follow for diabetes management

## 2023-08-04 NOTE — PATIENT INSTRUCTIONS
Diabetes:   - Referral to diabetes education placed today   - Increase actos from 30mg to 45mg once daily   - Continue glimepiride 2mg twice daily   - Increase levemir from 10 units to 14 units nightly   - Referral to Dr. Amna Lilly endocrinology   - Complete eye exam     Fast heart rate:   - Continue diltiazem 120mg once daily     Asthma:   - Complete pulmonary function test   - Continue albuterol as needed   - Add advair   - Start singulair     Wellness:   - Referral to [facility] for pap smear placed      SURVEY:    You may be receiving a survey from [employer] regarding your visit today. Please complete the survey to enable us to provide the highest quality of care to you and your family. If you cannot score us a very good on any question, please call the office to discuss how we could of made your experience a very good one. Thank you.

## 2023-08-08 ENCOUNTER — TELEPHONE (OUTPATIENT)
Dept: PRIMARY CARE CLINIC | Age: 38
End: 2023-08-08

## 2023-08-08 NOTE — TELEPHONE ENCOUNTER
----- Message from BRIDGETT Melgar CNP sent at 8/8/2023  8:06 AM EDT -----  Please call and request glucose readings   ----- Message -----  From: BRIDGETT Melgar CNP  Sent: 8/8/2023  12:00 AM EDT  To: BRIDGETT Melgar CNP    Request glucose readings

## 2023-08-09 ENCOUNTER — CARE COORDINATION (OUTPATIENT)
Dept: CARE COORDINATION | Age: 38
End: 2023-08-09

## 2023-08-09 NOTE — CARE COORDINATION
Voicemail left from patient attempting to return ACM calls on Friday 8/4. Attempted to reach ACM after business hours. CC outreach call placed to patient. Unable to reach Amanda. Left a voicemail message requesting a return phone call back to this ACM when patient is able to [phone], office hours given. Provided cost amounts for medications at different pharmacy using [facility]. Future Appointments   Date Time Provider [address]   8/14/2023  9:30 AM Mega Pimentel MD [facility]   9/1/2023  9:20 AM BRIDGETT Hernandez - CNP [facility] ([facility]       Care Coordination Plan of Care: This nurse Care Coordinator will  - await call back from patient, and if no return call; will attempt to reach patient back again next week    -discuss changing prescriptions for Lexapro and Wellbutrin XL to [facility] in [place] as prices are much cheaper there with [facility] card.  Give savings.   -enroll and follow for diabetes management

## 2023-08-14 ENCOUNTER — OFFICE VISIT (OUTPATIENT)
Dept: PULMONOLOGY | Age: 38
End: 2023-08-14
Payer: COMMERCIAL

## 2023-08-14 VITALS
HEART RATE: 105 BPM | WEIGHT: 230.5 LBS | RESPIRATION RATE: 16 BRPM | BODY MASS INDEX: 40.84 KG/M2 | OXYGEN SATURATION: 98 % | DIASTOLIC BLOOD PRESSURE: 83 MMHG | HEIGHT: 63 IN | SYSTOLIC BLOOD PRESSURE: 121 MMHG | TEMPERATURE: 97 F

## 2023-08-14 DIAGNOSIS — R91.1 RIGHT LOWER LOBE PULMONARY NODULE: ICD-10-CM

## 2023-08-14 DIAGNOSIS — J45.40 MODERATE PERSISTENT ASTHMA WITHOUT COMPLICATION: Primary | ICD-10-CM

## 2023-08-14 PROBLEM — J45.41 MODERATE PERSISTENT ASTHMA WITH EXACERBATION: Status: RESOLVED | Noted: 2023-04-11 | Resolved: 2023-08-14

## 2023-08-14 PROBLEM — J45.51 SEVERE PERSISTENT ASTHMA WITH EXACERBATION: Status: RESOLVED | Noted: 2023-04-04 | Resolved: 2023-08-14

## 2023-08-14 PROCEDURE — 99204 OFFICE O/P NEW MOD 45 MIN: CPT | Performed by: INTERNAL MEDICINE

## 2023-08-14 RX ORDER — FLUTICASONE PROPIONATE AND SALMETEROL 100; 50 UG/1; UG/1
1 POWDER RESPIRATORY (INHALATION) EVERY 12 HOURS
Qty: 90 EACH | Refills: 2 | Status: SHIPPED | OUTPATIENT
Start: 2023-08-14 | End: 2023-11-12

## 2023-08-14 RX ORDER — FLUTICASONE PROPIONATE 110 UG/1
2 AEROSOL, METERED RESPIRATORY (INHALATION) 2 TIMES DAILY
Qty: 12 G | Refills: 3 | Status: SHIPPED | OUTPATIENT
Start: 2023-08-14 | End: 2024-08-13

## 2023-08-14 NOTE — PROGRESS NOTES
movements intact  [x] sclera anicteric  Ears -  [x] hearing grossly normal bilaterally [] bilateral TM's and external ear canals normal  Nose -  [x] normal and patent [] no erythema  [] discharge  Mouth -  [x] mucous membranes moist  [] pharynx normal without lesions [] erythematous  [] exudate noted  Mallampati Airway Score -  [] I (soft palate, uvula, fauces, tonsillar pillars visible) [] II (soft palate, uvula, fauces visible) []  III (soft palate, base of uvula visible) [] IV (only hard palate visible)  Neck -  [x] supple  [] no significant adenopathy  [] carotids upstroke normal bilaterally [] no JVD  [] no bruit  Lymphatics -  [x] no palpable lymphadenopathy  [] no hepatosplenomegaly  Chest -   [x] normal chest excursion  [x] no chest wall tenderness  [] increased AP diameter[] pectus noted [] scoliosis noted [x] no tachypnea retraction or cyanosis [x] clear to auscultation, no wheezes, rales or rhonchi, symmetric air entry  [] wheezing noted  [] rales noted  []rhonchi noted [] decreased air entry noted bilaterally  Heart -  [x] normal rate,  [x] regular rhythm  [] irregularly irregular rhythm [x] normal S1  [x] normal S2  [x] no murmurs, rubs, clicks or gallops  [] S3 present  [] S4 present  [] systolic murmur  [] diastolic murmur  [] midsystolic click []pericardial rub present   Abdomen -  [] soft [] nontender  [] nondistended  [] no masses or organomegaly  Neurological -  [x] normal speech  [x] no focal findings or movement disorder noted  [] cranial nerves II through XII intact  [] DTR's normal and symmetric  [] Babinski sign negative,  [x] motor and sensory grossly normal bilaterally  [] normal muscle tone [x] no tremors  [] strength 5/5  [] Romberg sign negative [] normal gait   Musculoskeletal -  [x] no joint tenderness [] no deformity or swelling  Extremities - [x] no clubbing [x] no cyanosis [x] no pedal edema [] pedal edema [] intact peripheral pulses  Skin -  [x] normal coloration and turgor  [x] no dizziness

## 2023-08-14 NOTE — PATIENT INSTRUCTIONS
SURVEY:    You may be receiving a survey from [employer] regarding your visit today. Please complete the survey to enable us to provide the highest quality of care to you and your family. If you cannot score us a very good on any question, please call the office to discuss how we could have made your experience a very good one. Thank you.

## 2023-08-21 ENCOUNTER — CARE COORDINATION (OUTPATIENT)
Dept: CARE COORDINATION | Age: 38
End: 2023-08-21

## 2023-08-21 ENCOUNTER — TELEPHONE (OUTPATIENT)
Dept: PRIMARY CARE CLINIC | Age: 38
End: 2023-08-21

## 2023-08-21 NOTE — TELEPHONE ENCOUNTER
Please call and request glucose readings. Please also let the patient know that Jose Leija has attempted to get ahold of her regarding more affordable options regarding her medication.

## 2023-08-21 NOTE — CARE COORDINATION
ACC: Yamel Connor, RN      CC outreach call placed to patient. Unable to reach Amanda. Left a voicemail message requesting a return phone call back to this AC when patient is able to [phone], office hours given. Provided cost amounts for medications at different pharmacy using [facility].     -3rd unsuccessful outreach attempt     Future Appointments   Date Time Provider [address]   9/1/2023  9:20 AM BRIDGETT Byers - CNP [facility]   11/20/2023  9:00 [facility]       Care Coordination Plan of Care:    This nurse Care Coordinator will  - await call back from patient, and if no return call; will plan to elisa care coordination exclusion and remove name from care team as ACM has been unsuccessful x3 outreach attempts   -update PCP

## 2023-09-01 ENCOUNTER — OFFICE VISIT (OUTPATIENT)
Dept: PRIMARY CARE CLINIC | Age: 38
End: 2023-09-01
Payer: COMMERCIAL

## 2023-09-01 VITALS
SYSTOLIC BLOOD PRESSURE: 126 MMHG | TEMPERATURE: 98.8 F | WEIGHT: 229 LBS | BODY MASS INDEX: 40.57 KG/M2 | HEART RATE: 115 BPM | RESPIRATION RATE: 18 BRPM | HEIGHT: 63 IN | DIASTOLIC BLOOD PRESSURE: 80 MMHG | OXYGEN SATURATION: 98 %

## 2023-09-01 DIAGNOSIS — E11.65 TYPE 2 DIABETES MELLITUS WITH HYPERGLYCEMIA, WITHOUT LONG-TERM CURRENT USE OF INSULIN (HCC): Primary | ICD-10-CM

## 2023-09-01 DIAGNOSIS — J06.9 VIRAL URI: ICD-10-CM

## 2023-09-01 PROCEDURE — 3046F HEMOGLOBIN A1C LEVEL >9.0%: CPT | Performed by: NURSE PRACTITIONER

## 2023-09-01 PROCEDURE — 99213 OFFICE O/P EST LOW 20 MIN: CPT | Performed by: NURSE PRACTITIONER

## 2023-09-01 NOTE — PROGRESS NOTES
call 911     Completed Refills   Requested Prescriptions     Signed Prescriptions Disp Refills    insulin detemir (LEVEMIR FLEXTOUCH) 100 UNIT/ML injection pen 4 Adjustable Dose Pre-filled Pen Syringe 1     Sig: Inject 16 Units into the skin nightly       Orders Placed This Encounter   Procedures    Diabetic Foot Exam        No results found for this visit on 09/01/23. Return in about 3 months (around 12/1/2023), or if symptoms worsen or fail to improve, for DM + mood f/u .     Electronically signed by BRIDGETT Duarte CNP on 09/04/23 at 8:54 AM.

## 2023-09-01 NOTE — PATIENT INSTRUCTIONS
SURVEY:    You may be receiving a survey from [employer] regarding your visit today. Please complete the survey to enable us to provide the highest quality of care to you and your family. If you cannot score us a very good on any question, please call the office to discuss how we could of made your experience a very good one. Thank you.

## 2023-09-05 RX ORDER — MONTELUKAST SODIUM 10 MG/1
TABLET ORAL
Qty: 90 TABLET | Refills: 1 | Status: SHIPPED | OUTPATIENT
Start: 2023-09-05

## 2023-09-05 RX ORDER — PIOGLITAZONEHYDROCHLORIDE 45 MG/1
TABLET ORAL
Qty: 90 TABLET | Refills: 1 | Status: SHIPPED | OUTPATIENT
Start: 2023-09-05

## 2023-09-05 RX ORDER — ROSUVASTATIN CALCIUM 10 MG/1
TABLET, COATED ORAL
Qty: 90 TABLET | Refills: 1 | Status: SHIPPED | OUTPATIENT
Start: 2023-09-05

## 2023-09-12 RX ORDER — GLIMEPIRIDE 2 MG/1
2 TABLET ORAL 2 TIMES DAILY WITH MEALS
Qty: 180 TABLET | Refills: 1 | Status: SHIPPED | OUTPATIENT
Start: 2023-09-12

## 2023-12-15 ENCOUNTER — HOSPITAL ENCOUNTER (OUTPATIENT)
Age: 38
Discharge: HOME | End: 2023-12-15
Payer: COMMERCIAL

## 2023-12-15 LAB
ALBUMIN SERPL-MCNC: 4.2 G/DL (ref 3.5–5.2)
ALBUMIN/GLOB SERPL: 1.4 {RATIO} (ref 1–2.5)
ALP SERPL-CCNC: 73 U/L (ref 35–104)
ALT SERPL-CCNC: 17 U/L (ref 5–33)
ANION GAP SERPL CALCULATED.3IONS-SCNC: 8 MMOL/L (ref 9–17)
AST SERPL-CCNC: 11 U/L
BASOPHILS # BLD: 0.07 K/UL (ref 0–0.2)
BASOPHILS NFR BLD: 1 % (ref 0–2)
BILIRUB SERPL-MCNC: 0.5 MG/DL (ref 0.3–1.2)
BUN SERPL-MCNC: 8 MG/DL (ref 6–20)
BUN/CREAT SERPL: 16 (ref 9–20)
CALCIUM SERPL-MCNC: 9.4 MG/DL (ref 8.6–10.4)
CHLORIDE SERPL-SCNC: 100 MMOL/L (ref 98–107)
CHOLEST SERPL-MCNC: 157 MG/DL
CHOLESTEROL/HDL RATIO: 4.1
CO2 SERPL-SCNC: 28 MMOL/L (ref 20–31)
CREAT SERPL-MCNC: 0.5 MG/DL (ref 0.5–0.9)
CREAT UR-MCNC: 148.8 MG/DL (ref 28–217)
EOSINOPHIL # BLD: 0.11 K/UL (ref 0–0.44)
EOSINOPHILS RELATIVE PERCENT: 1 % (ref 1–4)
ERYTHROCYTE [DISTWIDTH] IN BLOOD BY AUTOMATED COUNT: 12.7 % (ref 11.8–14.4)
EST. AVERAGE GLUCOSE BLD GHB EST-MCNC: 275 MG/DL
GFR SERPL CREATININE-BSD FRML MDRD: >60 ML/MIN/1.73M2
GLUCOSE P FAST SERPL-MCNC: 284 MG/DL (ref 70–99)
GLUCOSE SERPL-MCNC: 284 MG/DL (ref 70–99)
HBA1C MFR BLD: 11.2 % (ref 4–6)
HCT VFR BLD AUTO: 42.7 % (ref 36.3–47.1)
HDLC SERPL-MCNC: 38 MG/DL
HGB BLD-MCNC: 14.3 G/DL (ref 11.9–15.1)
IMM GRANULOCYTES # BLD AUTO: 0.05 K/UL (ref 0–0.3)
IMM GRANULOCYTES NFR BLD: 1 %
LDLC SERPL CALC-MCNC: 82 MG/DL (ref 0–130)
LYMPHOCYTES NFR BLD: 3.48 K/UL (ref 1.1–3.7)
LYMPHOCYTES RELATIVE PERCENT: 33 % (ref 24–43)
MCH RBC QN AUTO: 29.7 PG (ref 25.2–33.5)
MCHC RBC AUTO-ENTMCNC: 33.5 G/DL (ref 28.4–34.8)
MCV RBC AUTO: 88.6 FL (ref 82.6–102.9)
MICROALBUMIN UR-MCNC: 19 MG/L
MICROALBUMIN/CREAT UR-RTO: 13 MCG/MG CREAT
MONOCYTES NFR BLD: 0.88 K/UL (ref 0.1–1.2)
MONOCYTES NFR BLD: 8 % (ref 3–12)
NEUTROPHILS NFR BLD: 56 % (ref 36–65)
NEUTS SEG NFR BLD: 5.95 K/UL (ref 1.5–8.1)
NRBC BLD-RTO: 0 PER 100 WBC
PLATELET # BLD AUTO: 391 K/UL (ref 138–453)
PMV BLD AUTO: 9.9 FL (ref 8.1–13.5)
POTASSIUM SERPL-SCNC: 4.1 MMOL/L (ref 3.7–5.3)
PROT SERPL-MCNC: 7.2 G/DL (ref 6.4–8.3)
RBC # BLD AUTO: 4.82 M/UL (ref 3.95–5.11)
SODIUM SERPL-SCNC: 136 MMOL/L (ref 135–144)
TRIGL SERPL-MCNC: 183 MG/DL
TSH SERPL DL<=0.05 MIU/L-ACNC: 3.31 UIU/ML (ref 0.3–5)
WBC OTHER # BLD: 10.5 K/UL (ref 3.5–11.3)

## 2023-12-15 PROCEDURE — 80053 COMPREHEN METABOLIC PANEL: CPT

## 2023-12-15 PROCEDURE — 36415 COLL VENOUS BLD VENIPUNCTURE: CPT

## 2023-12-15 PROCEDURE — 80061 LIPID PANEL: CPT

## 2023-12-15 PROCEDURE — 83036 HEMOGLOBIN GLYCOSYLATED A1C: CPT

## 2023-12-15 PROCEDURE — 84443 ASSAY THYROID STIM HORMONE: CPT

## 2023-12-15 PROCEDURE — 82043 UR ALBUMIN QUANTITATIVE: CPT

## 2023-12-15 PROCEDURE — 85025 COMPLETE CBC W/AUTO DIFF WBC: CPT

## 2023-12-15 PROCEDURE — 82570 ASSAY OF URINE CREATININE: CPT

## 2024-01-20 DIAGNOSIS — J45.40 MODERATE PERSISTENT ASTHMA WITHOUT COMPLICATION: ICD-10-CM

## 2024-01-22 RX ORDER — FLUTICASONE PROPIONATE AND SALMETEROL 100; 50 UG/1; UG/1
POWDER RESPIRATORY (INHALATION)
Qty: 60 EACH | Refills: 0 | Status: SHIPPED | OUTPATIENT
Start: 2024-01-22

## 2024-02-08 RX ORDER — DILTIAZEM HYDROCHLORIDE 120 MG/1
120 CAPSULE, COATED, EXTENDED RELEASE ORAL DAILY
Qty: 90 CAPSULE | Refills: 1 | Status: SHIPPED | OUTPATIENT
Start: 2024-02-08

## 2024-03-04 RX ORDER — ROSUVASTATIN CALCIUM 10 MG/1
10 TABLET, COATED ORAL DAILY
Qty: 90 TABLET | Refills: 0 | Status: SHIPPED | OUTPATIENT
Start: 2024-03-04

## 2024-03-04 RX ORDER — PIOGLITAZONEHYDROCHLORIDE 45 MG/1
45 TABLET ORAL DAILY
Qty: 90 TABLET | Refills: 0 | Status: SHIPPED | OUTPATIENT
Start: 2024-03-04

## 2024-03-08 DIAGNOSIS — J45.40 MODERATE PERSISTENT ASTHMA WITHOUT COMPLICATION: ICD-10-CM

## 2024-03-11 RX ORDER — FLUTICASONE PROPIONATE AND SALMETEROL 100; 50 UG/1; UG/1
POWDER RESPIRATORY (INHALATION)
Qty: 60 EACH | Refills: 0 | OUTPATIENT
Start: 2024-03-11

## 2024-03-14 DIAGNOSIS — J45.40 MODERATE PERSISTENT ASTHMA WITHOUT COMPLICATION: ICD-10-CM

## 2024-03-18 RX ORDER — FLUTICASONE PROPIONATE AND SALMETEROL 100; 50 UG/1; UG/1
POWDER RESPIRATORY (INHALATION)
Qty: 60 EACH | Refills: 0 | Status: SHIPPED | OUTPATIENT
Start: 2024-03-18

## 2024-05-19 DIAGNOSIS — J45.40 MODERATE PERSISTENT ASTHMA WITHOUT COMPLICATION: ICD-10-CM

## 2024-05-20 RX ORDER — FLUTICASONE PROPIONATE AND SALMETEROL 100; 50 UG/1; UG/1
POWDER RESPIRATORY (INHALATION)
Qty: 60 EACH | Refills: 0 | Status: SHIPPED | OUTPATIENT
Start: 2024-05-20

## 2024-07-09 DIAGNOSIS — E11.9 TYPE 2 DIABETES MELLITUS WITHOUT COMPLICATION, WITHOUT LONG-TERM CURRENT USE OF INSULIN (HCC): ICD-10-CM

## 2024-07-09 RX ORDER — PEN NEEDLE, DIABETIC 32GX 5/32"
NEEDLE, DISPOSABLE MISCELLANEOUS
Qty: 50 EACH | Refills: 0 | Status: SHIPPED | OUTPATIENT
Start: 2024-07-09

## 2024-07-09 NOTE — TELEPHONE ENCOUNTER
Spoke with patient and confirmed with her that these are needed. Pt scheduled for follow up with [facility] 10/10/24

## 2024-07-30 DIAGNOSIS — J45.40 MODERATE PERSISTENT ASTHMA WITHOUT COMPLICATION: ICD-10-CM

## 2024-07-30 RX ORDER — FLUTICASONE PROPIONATE AND SALMETEROL 100; 50 UG/1; UG/1
POWDER RESPIRATORY (INHALATION)
Qty: 60 EACH | Refills: 0 | OUTPATIENT
Start: 2024-07-30

## 2024-08-05 DIAGNOSIS — J45.40 MODERATE PERSISTENT ASTHMA WITHOUT COMPLICATION: ICD-10-CM

## 2024-08-05 RX ORDER — FLUTICASONE PROPIONATE AND SALMETEROL 100; 50 UG/1; UG/1
POWDER RESPIRATORY (INHALATION)
Qty: 60 EACH | Refills: 0 | OUTPATIENT
Start: 2024-08-05

## 2024-08-10 DIAGNOSIS — J45.40 MODERATE PERSISTENT ASTHMA WITHOUT COMPLICATION: ICD-10-CM

## 2024-08-12 RX ORDER — FLUTICASONE PROPIONATE AND SALMETEROL 100; 50 UG/1; UG/1
POWDER RESPIRATORY (INHALATION)
Qty: 60 EACH | Refills: 0 | OUTPATIENT
Start: 2024-08-12

## 2024-08-13 DIAGNOSIS — J45.40 MODERATE PERSISTENT ASTHMA WITHOUT COMPLICATION: ICD-10-CM

## 2024-08-13 RX ORDER — FLUTICASONE PROPIONATE AND SALMETEROL 100; 50 UG/1; UG/1
POWDER RESPIRATORY (INHALATION)
Qty: 60 EACH | Refills: 0 | OUTPATIENT
Start: 2024-08-13

## 2024-08-31 DIAGNOSIS — E11.9 TYPE 2 DIABETES MELLITUS WITHOUT COMPLICATION, WITHOUT LONG-TERM CURRENT USE OF INSULIN (HCC): ICD-10-CM

## 2024-09-03 RX ORDER — PEN NEEDLE, DIABETIC 32GX 5/32"
NEEDLE, DISPOSABLE MISCELLANEOUS
Qty: 50 EACH | Refills: 0 | Status: SHIPPED | OUTPATIENT
Start: 2024-09-03

## 2024-10-09 SDOH — ECONOMIC STABILITY: FOOD INSECURITY: WITHIN THE PAST 12 MONTHS, YOU WORRIED THAT YOUR FOOD WOULD RUN OUT BEFORE YOU GOT MONEY TO BUY MORE.: SOMETIMES TRUE

## 2024-10-09 SDOH — ECONOMIC STABILITY: INCOME INSECURITY: HOW HARD IS IT FOR YOU TO PAY FOR THE VERY BASICS LIKE FOOD, HOUSING, MEDICAL CARE, AND HEATING?: HARD

## 2024-10-09 SDOH — ECONOMIC STABILITY: FOOD INSECURITY: WITHIN THE PAST 12 MONTHS, THE FOOD YOU BOUGHT JUST DIDN'T LAST AND YOU DIDN'T HAVE MONEY TO GET MORE.: NEVER TRUE

## 2024-10-09 SDOH — ECONOMIC STABILITY: TRANSPORTATION INSECURITY
IN THE PAST 12 MONTHS, HAS LACK OF TRANSPORTATION KEPT YOU FROM MEETINGS, WORK, OR FROM GETTING THINGS NEEDED FOR DAILY LIVING?: NO

## 2024-10-09 ASSESSMENT — PATIENT HEALTH QUESTIONNAIRE - PHQ9
10. IF YOU CHECKED OFF ANY PROBLEMS, HOW DIFFICULT HAVE THESE PROBLEMS MADE IT FOR YOU TO DO YOUR WORK, TAKE CARE OF THINGS AT HOME, OR GET ALONG WITH OTHER PEOPLE: NOT DIFFICULT AT ALL
7. TROUBLE CONCENTRATING ON THINGS, SUCH AS READING THE NEWSPAPER OR WATCHING TELEVISION: MORE THAN HALF THE DAYS
1. LITTLE INTEREST OR PLEASURE IN DOING THINGS: SEVERAL DAYS
3. TROUBLE FALLING OR STAYING ASLEEP: SEVERAL DAYS
8. MOVING OR SPEAKING SO SLOWLY THAT OTHER PEOPLE COULD HAVE NOTICED. OR THE OPPOSITE, BEING SO FIGETY OR RESTLESS THAT YOU HAVE BEEN MOVING AROUND A LOT MORE THAN USUAL: NOT AT ALL
SUM OF ALL RESPONSES TO PHQ QUESTIONS 1-9: 10
SUM OF ALL RESPONSES TO PHQ QUESTIONS 1-9: 10
9. THOUGHTS THAT YOU WOULD BE BETTER OFF DEAD, OR OF HURTING YOURSELF: NOT AT ALL
2. FEELING DOWN, DEPRESSED OR HOPELESS: SEVERAL DAYS
SUM OF ALL RESPONSES TO PHQ9 QUESTIONS 1 & 2: 2
4. FEELING TIRED OR HAVING LITTLE ENERGY: SEVERAL DAYS
6. FEELING BAD ABOUT YOURSELF - OR THAT YOU ARE A FAILURE OR HAVE LET YOURSELF OR YOUR FAMILY DOWN: MORE THAN HALF THE DAYS
7. TROUBLE CONCENTRATING ON THINGS, SUCH AS READING THE NEWSPAPER OR WATCHING TELEVISION: MORE THAN HALF THE DAYS
5. POOR APPETITE OR OVEREATING: MORE THAN HALF THE DAYS
4. FEELING TIRED OR HAVING LITTLE ENERGY: SEVERAL DAYS
8. MOVING OR SPEAKING SO SLOWLY THAT OTHER PEOPLE COULD HAVE NOTICED. OR THE OPPOSITE - BEING SO FIDGETY OR RESTLESS THAT YOU HAVE BEEN MOVING AROUND A LOT MORE THAN USUAL: NOT AT ALL
3. TROUBLE FALLING OR STAYING ASLEEP: SEVERAL DAYS
2. FEELING DOWN, DEPRESSED OR HOPELESS: SEVERAL DAYS
6. FEELING BAD ABOUT YOURSELF - OR THAT YOU ARE A FAILURE OR HAVE LET YOURSELF OR YOUR FAMILY DOWN: MORE THAN HALF THE DAYS
5. POOR APPETITE OR OVEREATING: MORE THAN HALF THE DAYS
SUM OF ALL RESPONSES TO PHQ QUESTIONS 1-9: 10
SUM OF ALL RESPONSES TO PHQ QUESTIONS 1-9: 10
10. IF YOU CHECKED OFF ANY PROBLEMS, HOW DIFFICULT HAVE THESE PROBLEMS MADE IT FOR YOU TO DO YOUR WORK, TAKE CARE OF THINGS AT HOME, OR GET ALONG WITH OTHER PEOPLE: NOT DIFFICULT AT ALL
1. LITTLE INTEREST OR PLEASURE IN DOING THINGS: SEVERAL DAYS
SUM OF ALL RESPONSES TO PHQ QUESTIONS 1-9: 10

## 2024-10-10 ENCOUNTER — OFFICE VISIT (OUTPATIENT)
Dept: PRIMARY CARE CLINIC | Age: 39
End: 2024-10-10

## 2024-10-10 VITALS
SYSTOLIC BLOOD PRESSURE: 132 MMHG | DIASTOLIC BLOOD PRESSURE: 82 MMHG | WEIGHT: 188 LBS | BODY MASS INDEX: 33.3 KG/M2 | HEART RATE: 87 BPM | OXYGEN SATURATION: 100 % | TEMPERATURE: 96.9 F

## 2024-10-10 DIAGNOSIS — F41.9 ANXIETY: ICD-10-CM

## 2024-10-10 DIAGNOSIS — J45.20 MILD INTERMITTENT ASTHMA WITHOUT COMPLICATION: ICD-10-CM

## 2024-10-10 DIAGNOSIS — Z12.4 CERVICAL CANCER SCREENING: ICD-10-CM

## 2024-10-10 DIAGNOSIS — F43.10 PTSD (POST-TRAUMATIC STRESS DISORDER): ICD-10-CM

## 2024-10-10 DIAGNOSIS — Z12.31 BREAST CANCER SCREENING BY MAMMOGRAM: ICD-10-CM

## 2024-10-10 DIAGNOSIS — Z00.00 WELLNESS EXAMINATION: Primary | ICD-10-CM

## 2024-10-10 DIAGNOSIS — F33.1 MODERATE EPISODE OF RECURRENT MAJOR DEPRESSIVE DISORDER (HCC): ICD-10-CM

## 2024-10-10 DIAGNOSIS — E11.65 TYPE 2 DIABETES MELLITUS WITH HYPERGLYCEMIA, WITHOUT LONG-TERM CURRENT USE OF INSULIN (HCC): ICD-10-CM

## 2024-10-10 DIAGNOSIS — E78.49 OTHER HYPERLIPIDEMIA: ICD-10-CM

## 2024-10-10 DIAGNOSIS — I47.11 INAPPROPRIATE SINUS TACHYCARDIA (HCC): ICD-10-CM

## 2024-10-10 DIAGNOSIS — F32.0 CURRENT MILD EPISODE OF MAJOR DEPRESSIVE DISORDER WITHOUT PRIOR EPISODE (HCC): ICD-10-CM

## 2024-10-10 RX ORDER — DILTIAZEM HYDROCHLORIDE 120 MG/1
120 CAPSULE, COATED, EXTENDED RELEASE ORAL DAILY
Qty: 90 CAPSULE | Refills: 3 | Status: SHIPPED | OUTPATIENT
Start: 2024-10-10

## 2024-10-10 RX ORDER — ESCITALOPRAM OXALATE 20 MG/1
20 TABLET ORAL DAILY
Qty: 90 TABLET | Refills: 3 | Status: SHIPPED | OUTPATIENT
Start: 2024-10-10

## 2024-10-10 RX ORDER — SEMAGLUTIDE 2.68 MG/ML
2 INJECTION, SOLUTION SUBCUTANEOUS
Qty: 3 ML | Refills: 0 | Status: SHIPPED
Start: 2024-10-10

## 2024-10-10 RX ORDER — ALBUTEROL SULFATE 90 UG/1
2 INHALANT RESPIRATORY (INHALATION) EVERY 4 HOURS PRN
Qty: 18 G | Refills: 5 | Status: SHIPPED | OUTPATIENT
Start: 2024-10-10

## 2024-10-10 RX ORDER — BUPROPION HYDROCHLORIDE 150 MG/1
150 TABLET ORAL EVERY MORNING
Qty: 90 TABLET | Refills: 3 | Status: SHIPPED | OUTPATIENT
Start: 2024-10-10

## 2024-10-10 RX ORDER — ROSUVASTATIN CALCIUM 10 MG/1
10 TABLET, COATED ORAL DAILY
Qty: 90 TABLET | Refills: 3 | Status: SHIPPED | OUTPATIENT
Start: 2024-10-10

## 2024-10-10 ASSESSMENT — ENCOUNTER SYMPTOMS
DIARRHEA: 0
CONSTIPATION: 0
SHORTNESS OF BREATH: 0

## 2024-10-10 NOTE — PROGRESS NOTES
screen    Asthma:  - Stable  - Continue albuterol and DuoNeb as needed    DM Type 2:  -- Continue ozempic 2mg once weekly and Toujeo insulin 5 units QD as prescribed by endocrinology  -- Continue following with endocrinology, Dr. Reynoso q6 months     Sinus Tachycardia:  - Stable   - Continue diltiazem 120mg QD    Anxiety, Depression, PTSD:  - Stable   - Continue lexapro 20mg QD and wellbutrin 150mg QD    Hyperlipidemia:  - Repeat lipid panel   - Continue rosuvastatin 10mg QD at this time     -- Reviewed emergent signs and symptoms and when to seek care at the emergency department and/or call 911     Completed Refills   Requested Prescriptions     Signed Prescriptions Disp Refills    albuterol sulfate HFA (VENTOLIN HFA) 108 (90 Base) MCG/ACT inhaler 18 g 5     Sig: Inhale 2 puffs into the lungs every 4 hours as needed for Wheezing or Shortness of Breath    buPROPion (WELLBUTRIN XL) 150 MG extended release tablet 90 tablet 3     Sig: Take 1 tablet by mouth every morning    dilTIAZem (CARDIZEM CD) 120 MG extended release capsule 90 capsule 3     Sig: Take 1 capsule by mouth daily    escitalopram (LEXAPRO) 20 MG tablet 90 tablet 3     Sig: Take 1 tablet by mouth daily    rosuvastatin (CRESTOR) 10 MG tablet 90 tablet 3     Sig: Take 1 tablet by mouth daily    semaglutide, 2 MG/DOSE, (OZEMPIC, 2 MG/DOSE,) 8 MG/3ML SOPN sc injection 3 mL 0     Sig: Inject 2 mg into the skin every 7 days    Insulin Glargine, 2 Unit Dial, 300 UNIT/ML SOPN 1 Adjustable Dose Pre-filled Pen Syringe 0     Sig: Inject 5 Units into the skin daily       Orders Placed This Encounter   Procedures    DALTON MALIKA DIGITAL SCREEN BILATERAL     Standing Status:   Future     Standing Expiration Date:   12/10/2025    Lipid Panel     Standing Status:   Future     Standing Expiration Date:   10/10/2025    Hemoglobin A1C     Standing Status:   Future     Standing Expiration Date:   10/10/2025    CBC     Standing Status:   Future     Standing Expiration Date:

## 2024-10-11 ENCOUNTER — HOSPITAL ENCOUNTER (OUTPATIENT)
Age: 39
Discharge: HOME | End: 2024-10-11
Payer: COMMERCIAL

## 2024-10-11 DIAGNOSIS — Z00.00 WELLNESS EXAMINATION: ICD-10-CM

## 2024-10-11 DIAGNOSIS — E11.65 TYPE 2 DIABETES MELLITUS WITH HYPERGLYCEMIA, WITHOUT LONG-TERM CURRENT USE OF INSULIN (HCC): ICD-10-CM

## 2024-10-11 LAB
ALT SERPL-CCNC: 30 U/L (ref 10–35)
ANION GAP SERPL CALCULATED.3IONS-SCNC: 10 MMOL/L (ref 9–16)
AST SERPL-CCNC: 22 U/L (ref 10–35)
BUN SERPL-MCNC: 6 MG/DL (ref 6–20)
BUN/CREAT SERPL: 10 (ref 9–20)
CALCIUM SERPL-MCNC: 9.6 MG/DL (ref 8.6–10.4)
CHLORIDE SERPL-SCNC: 103 MMOL/L (ref 98–107)
CO2 SERPL-SCNC: 28 MMOL/L (ref 20–31)
CREAT SERPL-MCNC: 0.6 MG/DL (ref 0.5–0.9)
ERYTHROCYTE [DISTWIDTH] IN BLOOD BY AUTOMATED COUNT: 12.5 % (ref 11.8–14.4)
GFR, ESTIMATED: >90 ML/MIN/1.73M2
GLUCOSE SERPL-MCNC: 128 MG/DL (ref 74–99)
HCT VFR BLD AUTO: 45.2 % (ref 36.3–47.1)
HGB BLD-MCNC: 15.3 G/DL (ref 11.9–15.1)
MCH RBC QN AUTO: 30.2 PG (ref 25.2–33.5)
MCHC RBC AUTO-ENTMCNC: 33.8 G/DL (ref 28.4–34.8)
MCV RBC AUTO: 89.3 FL (ref 82.6–102.9)
NRBC BLD-RTO: 0 PER 100 WBC
PLATELET # BLD AUTO: 458 K/UL (ref 138–453)
PMV BLD AUTO: 10.1 FL (ref 8.1–13.5)
POTASSIUM SERPL-SCNC: 3.8 MMOL/L (ref 3.7–5.3)
RBC # BLD AUTO: 5.06 M/UL (ref 3.95–5.11)
SODIUM SERPL-SCNC: 141 MMOL/L (ref 136–145)
TSH SERPL DL<=0.05 MIU/L-ACNC: 1.6 UIU/ML (ref 0.27–4.2)
WBC OTHER # BLD: 10.1 K/UL (ref 3.5–11.3)

## 2024-10-11 PROCEDURE — 83036 HEMOGLOBIN GLYCOSYLATED A1C: CPT

## 2024-10-11 PROCEDURE — 36415 COLL VENOUS BLD VENIPUNCTURE: CPT

## 2024-10-11 PROCEDURE — 82306 VITAMIN D 25 HYDROXY: CPT

## 2024-10-11 PROCEDURE — 82570 ASSAY OF URINE CREATININE: CPT

## 2024-10-11 PROCEDURE — 84443 ASSAY THYROID STIM HORMONE: CPT

## 2024-10-11 PROCEDURE — 82043 UR ALBUMIN QUANTITATIVE: CPT

## 2024-10-11 PROCEDURE — 80061 LIPID PANEL: CPT

## 2024-10-11 PROCEDURE — 86803 HEPATITIS C AB TEST: CPT

## 2024-10-11 PROCEDURE — 84450 TRANSFERASE (AST) (SGOT): CPT

## 2024-10-11 PROCEDURE — 87389 HIV-1 AG W/HIV-1&-2 AB AG IA: CPT

## 2024-10-11 PROCEDURE — 84460 ALANINE AMINO (ALT) (SGPT): CPT

## 2024-10-11 PROCEDURE — 80048 BASIC METABOLIC PNL TOTAL CA: CPT

## 2024-10-11 PROCEDURE — 85027 COMPLETE CBC AUTOMATED: CPT

## 2024-10-12 LAB
25(OH)D3 SERPL-MCNC: 17.1 NG/ML (ref 30–100)
CHOLEST SERPL-MCNC: 188 MG/DL (ref 0–199)
CHOLESTEROL/HDL RATIO: 5
CREAT UR-MCNC: 91.1 MG/DL (ref 28–217)
EST. AVERAGE GLUCOSE BLD GHB EST-MCNC: 117 MG/DL
HBA1C MFR BLD: 5.7 % (ref 4–6)
HCV AB SERPL QL IA: NONREACTIVE
HDLC SERPL-MCNC: 38 MG/DL
HIV 1+2 AB+HIV1 P24 AG SERPL QL IA: NONREACTIVE
LDLC SERPL CALC-MCNC: 126 MG/DL (ref 0–100)
MICROALBUMIN UR-MCNC: <12 MG/L (ref 0–20)
MICROALBUMIN/CREAT UR-RTO: NORMAL MCG/MG CREAT (ref 0–25)
TRIGL SERPL-MCNC: 122 MG/DL
VLDLC SERPL CALC-MCNC: 24 MG/DL

## 2024-10-23 DIAGNOSIS — E11.9 TYPE 2 DIABETES MELLITUS WITHOUT COMPLICATION, WITHOUT LONG-TERM CURRENT USE OF INSULIN (HCC): ICD-10-CM

## 2024-10-23 RX ORDER — PEN NEEDLE, DIABETIC 32GX 5/32"
NEEDLE, DISPOSABLE MISCELLANEOUS
Qty: 50 EACH | Refills: 0 | OUTPATIENT
Start: 2024-10-23

## 2025-04-09 SDOH — ECONOMIC STABILITY: TRANSPORTATION INSECURITY
IN THE PAST 12 MONTHS, HAS LACK OF TRANSPORTATION KEPT YOU FROM MEETINGS, WORK, OR FROM GETTING THINGS NEEDED FOR DAILY LIVING?: PATIENT DECLINED

## 2025-04-09 SDOH — ECONOMIC STABILITY: FOOD INSECURITY: WITHIN THE PAST 12 MONTHS, YOU WORRIED THAT YOUR FOOD WOULD RUN OUT BEFORE YOU GOT MONEY TO BUY MORE.: PATIENT DECLINED

## 2025-04-09 SDOH — ECONOMIC STABILITY: FOOD INSECURITY: WITHIN THE PAST 12 MONTHS, THE FOOD YOU BOUGHT JUST DIDN'T LAST AND YOU DIDN'T HAVE MONEY TO GET MORE.: PATIENT DECLINED

## 2025-04-09 SDOH — ECONOMIC STABILITY: INCOME INSECURITY: IN THE LAST 12 MONTHS, WAS THERE A TIME WHEN YOU WERE NOT ABLE TO PAY THE MORTGAGE OR RENT ON TIME?: PATIENT DECLINED

## 2025-04-09 SDOH — ECONOMIC STABILITY: TRANSPORTATION INSECURITY
IN THE PAST 12 MONTHS, HAS THE LACK OF TRANSPORTATION KEPT YOU FROM MEDICAL APPOINTMENTS OR FROM GETTING MEDICATIONS?: PATIENT DECLINED

## 2025-04-09 ASSESSMENT — PATIENT HEALTH QUESTIONNAIRE - PHQ9
5. POOR APPETITE OR OVEREATING: SEVERAL DAYS
1. LITTLE INTEREST OR PLEASURE IN DOING THINGS: SEVERAL DAYS
SUM OF ALL RESPONSES TO PHQ QUESTIONS 1-9: 8
SUM OF ALL RESPONSES TO PHQ QUESTIONS 1-9: 8
10. IF YOU CHECKED OFF ANY PROBLEMS, HOW DIFFICULT HAVE THESE PROBLEMS MADE IT FOR YOU TO DO YOUR WORK, TAKE CARE OF THINGS AT HOME, OR GET ALONG WITH OTHER PEOPLE: SOMEWHAT DIFFICULT
5. POOR APPETITE OR OVEREATING: SEVERAL DAYS
SUM OF ALL RESPONSES TO PHQ QUESTIONS 1-9: 8
4. FEELING TIRED OR HAVING LITTLE ENERGY: SEVERAL DAYS
1. LITTLE INTEREST OR PLEASURE IN DOING THINGS: SEVERAL DAYS
9. THOUGHTS THAT YOU WOULD BE BETTER OFF DEAD, OR OF HURTING YOURSELF: NOT AT ALL
4. FEELING TIRED OR HAVING LITTLE ENERGY: SEVERAL DAYS
6. FEELING BAD ABOUT YOURSELF - OR THAT YOU ARE A FAILURE OR HAVE LET YOURSELF OR YOUR FAMILY DOWN: MORE THAN HALF THE DAYS
3. TROUBLE FALLING OR STAYING ASLEEP: SEVERAL DAYS
SUM OF ALL RESPONSES TO PHQ QUESTIONS 1-9: 8
8. MOVING OR SPEAKING SO SLOWLY THAT OTHER PEOPLE COULD HAVE NOTICED. OR THE OPPOSITE, BEING SO FIGETY OR RESTLESS THAT YOU HAVE BEEN MOVING AROUND A LOT MORE THAN USUAL: NOT AT ALL
2. FEELING DOWN, DEPRESSED OR HOPELESS: SEVERAL DAYS
7. TROUBLE CONCENTRATING ON THINGS, SUCH AS READING THE NEWSPAPER OR WATCHING TELEVISION: SEVERAL DAYS
8. MOVING OR SPEAKING SO SLOWLY THAT OTHER PEOPLE COULD HAVE NOTICED. OR THE OPPOSITE - BEING SO FIDGETY OR RESTLESS THAT YOU HAVE BEEN MOVING AROUND A LOT MORE THAN USUAL: NOT AT ALL
6. FEELING BAD ABOUT YOURSELF - OR THAT YOU ARE A FAILURE OR HAVE LET YOURSELF OR YOUR FAMILY DOWN: MORE THAN HALF THE DAYS
10. IF YOU CHECKED OFF ANY PROBLEMS, HOW DIFFICULT HAVE THESE PROBLEMS MADE IT FOR YOU TO DO YOUR WORK, TAKE CARE OF THINGS AT HOME, OR GET ALONG WITH OTHER PEOPLE: SOMEWHAT DIFFICULT
2. FEELING DOWN, DEPRESSED OR HOPELESS: SEVERAL DAYS
SUM OF ALL RESPONSES TO PHQ QUESTIONS 1-9: 8
3. TROUBLE FALLING OR STAYING ASLEEP: SEVERAL DAYS
9. THOUGHTS THAT YOU WOULD BE BETTER OFF DEAD, OR OF HURTING YOURSELF: NOT AT ALL
7. TROUBLE CONCENTRATING ON THINGS, SUCH AS READING THE NEWSPAPER OR WATCHING TELEVISION: SEVERAL DAYS

## 2025-04-10 ENCOUNTER — OFFICE VISIT (OUTPATIENT)
Dept: PRIMARY CARE CLINIC | Age: 40
End: 2025-04-10
Payer: COMMERCIAL

## 2025-04-10 VITALS
WEIGHT: 192 LBS | DIASTOLIC BLOOD PRESSURE: 80 MMHG | TEMPERATURE: 96.8 F | SYSTOLIC BLOOD PRESSURE: 120 MMHG | HEART RATE: 108 BPM | BODY MASS INDEX: 34.01 KG/M2 | OXYGEN SATURATION: 100 %

## 2025-04-10 DIAGNOSIS — Z79.4 TYPE 2 DIABETES MELLITUS WITHOUT COMPLICATION, WITH LONG-TERM CURRENT USE OF INSULIN: ICD-10-CM

## 2025-04-10 DIAGNOSIS — E11.9 TYPE 2 DIABETES MELLITUS WITHOUT COMPLICATION, WITH LONG-TERM CURRENT USE OF INSULIN: ICD-10-CM

## 2025-04-10 DIAGNOSIS — F41.9 ANXIETY: Primary | ICD-10-CM

## 2025-04-10 DIAGNOSIS — F33.1 MODERATE EPISODE OF RECURRENT MAJOR DEPRESSIVE DISORDER (HCC): ICD-10-CM

## 2025-04-10 PROCEDURE — G2211 COMPLEX E/M VISIT ADD ON: HCPCS | Performed by: NURSE PRACTITIONER

## 2025-04-10 PROCEDURE — 99214 OFFICE O/P EST MOD 30 MIN: CPT | Performed by: NURSE PRACTITIONER

## 2025-04-10 RX ORDER — CLOBETASOL PROPIONATE 0.5 MG/ML
SOLUTION TOPICAL
COMMUNITY
Start: 2025-03-24

## 2025-04-10 RX ORDER — BUPROPION HYDROCHLORIDE 300 MG/1
300 TABLET ORAL EVERY MORNING
Qty: 30 TABLET | Refills: 3 | Status: SHIPPED | OUTPATIENT
Start: 2025-04-10

## 2025-04-10 RX ORDER — HYDROCORTISONE 25 MG/G
OINTMENT TOPICAL
COMMUNITY
Start: 2025-03-24

## 2025-04-10 RX ORDER — TRIAMCINOLONE ACETONIDE 1 MG/G
OINTMENT TOPICAL
COMMUNITY
Start: 2025-03-24

## 2025-04-10 RX ORDER — FEXOFENADINE HCL 180 MG/1
180 TABLET ORAL DAILY
COMMUNITY

## 2025-04-10 NOTE — PROGRESS NOTES
Name: Amanda Real  : 1985         Chief Complaint:     Chief Complaint   Patient presents with    Diabetes     Diabetic diet/low carb diet compliance:  compliant most of the time  Current exercise: walks and the gym   Frequency of glucose testing at home: dexcom  Home blood sugar records: 130  History of hypoglycemic episodes: no    Hyperlipidemia    Anxiety     Anxiety is not great. Weight loss is causing issue physically. eczema flare up    Other     Current symptoms: Denies excessive hunger, excessive thirst, or increased frequency of urination. Denies fatigue, numbness/tingling in hands or feet, or major changes in weight. Denies vision changes or sores that are slow to heal.   Medication compliance:  compliant all of the time  Hemoglobin A1C (%)10/11/2024   5.7               History of Present Illness:      Amanda Real is a 39 y.o.  female who presents with Diabetes (Diabetic diet/low carb diet compliance:  compliant most of the time/Current exercise: walks and the gym /Frequency of glucose testing at home: dexcom/Home blood sugar records: 130/History of hypoglycemic episodes: no), Hyperlipidemia, Anxiety (Anxiety is not great. Weight loss is causing issue physically. eczema flare up), and Other (Current symptoms: Denies excessive hunger, excessive thirst, or increased frequency of urination. Denies fatigue, numbness/tingling in hands or feet, or major changes in weight. Denies vision changes or sores that are slow to heal. /Medication compliance:  compliant all of the time/Hemoglobin A1C (%)10/11/2024   5.7    //)      HPI    Anxiety, Depression:  Current treatment includes lexapro 20mg QD and wellbutrin 150mg QD. She states her anxiety has been worse lately. Admits concerns with body image issues. She had to cut off her hair d/t extensive skin rash within the last month (following with derm) and this was tough. Denies thoughts of hurting self or others.     DMT2:  Endocrinology: Lizz Bnoe CNP

## 2025-05-13 ENCOUNTER — OFFICE VISIT (OUTPATIENT)
Dept: PRIMARY CARE CLINIC | Age: 40
End: 2025-05-13
Payer: COMMERCIAL

## 2025-05-13 VITALS
WEIGHT: 190 LBS | BODY MASS INDEX: 33.66 KG/M2 | HEART RATE: 110 BPM | OXYGEN SATURATION: 99 % | SYSTOLIC BLOOD PRESSURE: 110 MMHG | DIASTOLIC BLOOD PRESSURE: 70 MMHG | TEMPERATURE: 96.8 F

## 2025-05-13 DIAGNOSIS — F41.9 ANXIETY: Primary | ICD-10-CM

## 2025-05-13 DIAGNOSIS — R00.0 TACHYCARDIA: ICD-10-CM

## 2025-05-13 PROCEDURE — 99214 OFFICE O/P EST MOD 30 MIN: CPT | Performed by: NURSE PRACTITIONER

## 2025-05-13 PROCEDURE — G2211 COMPLEX E/M VISIT ADD ON: HCPCS | Performed by: NURSE PRACTITIONER

## 2025-08-11 RX ORDER — BUPROPION HYDROCHLORIDE 300 MG/1
300 TABLET ORAL EVERY MORNING
Qty: 90 TABLET | Refills: 1 | Status: SHIPPED | OUTPATIENT
Start: 2025-08-11